# Patient Record
Sex: FEMALE | Race: WHITE | Employment: UNEMPLOYED | URBAN - METROPOLITAN AREA
[De-identification: names, ages, dates, MRNs, and addresses within clinical notes are randomized per-mention and may not be internally consistent; named-entity substitution may affect disease eponyms.]

---

## 2017-01-05 ENCOUNTER — OFFICE VISIT (OUTPATIENT)
Dept: FAMILY MEDICINE CLINIC | Age: 52
End: 2017-01-05

## 2017-01-05 VITALS
RESPIRATION RATE: 12 BRPM | DIASTOLIC BLOOD PRESSURE: 90 MMHG | HEIGHT: 64 IN | HEART RATE: 52 BPM | SYSTOLIC BLOOD PRESSURE: 110 MMHG | BODY MASS INDEX: 32.32 KG/M2 | TEMPERATURE: 98.3 F | WEIGHT: 189.31 LBS

## 2017-01-05 DIAGNOSIS — B95.8 STAPHYLOCOCCUS INFECTION OF NOSE: Primary | ICD-10-CM

## 2017-01-05 DIAGNOSIS — J34.89 STAPHYLOCOCCUS INFECTION OF NOSE: Primary | ICD-10-CM

## 2017-01-05 DIAGNOSIS — R59.1 LYMPHADENOPATHY: ICD-10-CM

## 2017-01-05 PROCEDURE — 96372 THER/PROPH/DIAG INJ SC/IM: CPT | Performed by: FAMILY MEDICINE

## 2017-01-05 PROCEDURE — 99213 OFFICE O/P EST LOW 20 MIN: CPT | Performed by: FAMILY MEDICINE

## 2017-01-05 RX ORDER — CEFTRIAXONE 1 G/1
1 INJECTION, POWDER, FOR SOLUTION INTRAMUSCULAR; INTRAVENOUS ONCE
Status: COMPLETED | OUTPATIENT
Start: 2017-01-05 | End: 2017-01-05

## 2017-01-05 RX ORDER — AMOXICILLIN AND CLAVULANATE POTASSIUM 875; 125 MG/1; MG/1
1 TABLET, FILM COATED ORAL 2 TIMES DAILY
Qty: 20 TABLET | Refills: 0 | Status: SHIPPED | OUTPATIENT
Start: 2017-01-05 | End: 2017-01-15

## 2017-01-05 RX ORDER — TRIAMCINOLONE ACETONIDE 40 MG/ML
80 INJECTION, SUSPENSION INTRA-ARTICULAR; INTRAMUSCULAR ONCE
Status: SHIPPED | OUTPATIENT
Start: 2017-01-05

## 2017-01-05 RX ADMIN — CEFTRIAXONE 1 G: 1 INJECTION, POWDER, FOR SOLUTION INTRAMUSCULAR; INTRAVENOUS at 14:40

## 2017-01-05 ASSESSMENT — ENCOUNTER SYMPTOMS
VOMITING: 0
DIARRHEA: 0
EYES NEGATIVE: 1
SORE THROAT: 1
SWOLLEN GLANDS: 1
NAUSEA: 0
CONSTIPATION: 0
SHORTNESS OF BREATH: 0
ABDOMINAL PAIN: 0
RHINORRHEA: 1
COUGH: 1

## 2017-01-09 ENCOUNTER — TELEPHONE (OUTPATIENT)
Dept: FAMILY MEDICINE CLINIC | Age: 52
End: 2017-01-09

## 2017-01-11 DIAGNOSIS — Z13.9 SCREENING: Primary | ICD-10-CM

## 2017-11-13 ENCOUNTER — OFFICE VISIT (OUTPATIENT)
Dept: FAMILY MEDICINE CLINIC | Age: 52
End: 2017-11-13

## 2017-11-13 VITALS
HEART RATE: 79 BPM | RESPIRATION RATE: 16 BRPM | SYSTOLIC BLOOD PRESSURE: 122 MMHG | HEIGHT: 64 IN | DIASTOLIC BLOOD PRESSURE: 86 MMHG | TEMPERATURE: 98.3 F | OXYGEN SATURATION: 99 %

## 2017-11-13 DIAGNOSIS — Z12.31 ENCOUNTER FOR SCREENING MAMMOGRAM FOR BREAST CANCER: ICD-10-CM

## 2017-11-13 DIAGNOSIS — M62.830 LUMBAR PARASPINAL MUSCLE SPASM: Primary | ICD-10-CM

## 2017-11-13 DIAGNOSIS — R30.0 DYSURIA: ICD-10-CM

## 2017-11-13 LAB
BILIRUBIN, POC: NORMAL
BLOOD URINE, POC: NORMAL
CLARITY, POC: NORMAL
COLOR, POC: NORMAL
GLUCOSE URINE, POC: NORMAL
KETONES, POC: NORMAL
LEUKOCYTE EST, POC: NORMAL
NITRITE, POC: NORMAL
PH, POC: 6
PROTEIN, POC: NORMAL
SPECIFIC GRAVITY, POC: 1.03
UROBILINOGEN, POC: NORMAL

## 2017-11-13 PROCEDURE — 1036F TOBACCO NON-USER: CPT | Performed by: FAMILY MEDICINE

## 2017-11-13 PROCEDURE — 99213 OFFICE O/P EST LOW 20 MIN: CPT | Performed by: FAMILY MEDICINE

## 2017-11-13 PROCEDURE — 81002 URINALYSIS NONAUTO W/O SCOPE: CPT | Performed by: FAMILY MEDICINE

## 2017-11-13 PROCEDURE — G8421 BMI NOT CALCULATED: HCPCS | Performed by: FAMILY MEDICINE

## 2017-11-13 PROCEDURE — G8484 FLU IMMUNIZE NO ADMIN: HCPCS | Performed by: FAMILY MEDICINE

## 2017-11-13 PROCEDURE — G8427 DOCREV CUR MEDS BY ELIG CLIN: HCPCS | Performed by: FAMILY MEDICINE

## 2017-11-13 PROCEDURE — 3017F COLORECTAL CA SCREEN DOC REV: CPT | Performed by: FAMILY MEDICINE

## 2017-11-13 RX ORDER — OXYCODONE HYDROCHLORIDE AND ACETAMINOPHEN 5; 325 MG/1; MG/1
1 TABLET ORAL EVERY 6 HOURS PRN
Qty: 20 TABLET | Refills: 0 | Status: SHIPPED | OUTPATIENT
Start: 2017-11-13 | End: 2017-11-20

## 2017-11-13 RX ORDER — CYCLOBENZAPRINE HCL 10 MG
10 TABLET ORAL 3 TIMES DAILY PRN
Qty: 60 TABLET | Refills: 0 | Status: SHIPPED | OUTPATIENT
Start: 2017-11-13 | End: 2017-11-23

## 2017-11-13 RX ORDER — OCTISALATE, AVOBENZONE, HOMOSALATE, AND OCTOCRYLENE 29.4; 29.4; 49; 25.48 MG/ML; MG/ML; MG/ML; MG/ML
4 LOTION TOPICAL DAILY
COMMUNITY
Start: 2017-07-12

## 2017-11-13 NOTE — PROGRESS NOTES
Subjective:      Patient ID: Diane León is a 50 y.o. female. Chief Complaint   Patient presents with    Back Pain     x 1 day. injury occurred when she bent over. Pain started in lower back and radiates upward into upper back and shoulders. Pain is sometimes very sharp. Has difficulty moving legs. Describes pain as \"electrical shock\". Has tried Tylenol and Advil with no relief. HPI    She is having some back pain for last day she bent over to pick something up and had severe pain in her lower back up on her shoulder. She describes like electric shock she denies any other trauma in the past.  Use Tylenol Advil no relief no bowel or bladder problems at this time is being going to bathroom more              Allergies   Allergen Reactions    Prochlorperazine Maleate      Outpatient Encounter Prescriptions as of 11/13/2017   Medication Sig Dispense Refill    linaclotide (LINZESS) 145 MCG capsule Take 145 mcg by mouth every morning (before breakfast)      Probiotic Product (ALIGN) 4 MG CAPS Take 4 mg by mouth daily      cyclobenzaprine (FLEXERIL) 10 MG tablet Take 1 tablet by mouth 3 times daily as needed for Muscle spasms 60 tablet 0    oxyCODONE-acetaminophen (PERCOCET) 5-325 MG per tablet Take 1 tablet by mouth every 6 hours as needed for Pain . 20 tablet 0    esomeprazole (NEXIUM) 40 MG capsule Take 40 mg by mouth every morning (before breakfast).  Fexofenadine-Pseudoephedrine (ALLEGRA-D 12 HOUR PO) Take  by mouth 2 times daily as needed. Facility-Administered Encounter Medications as of 11/13/2017   Medication Dose Route Frequency Provider Last Rate Last Dose    triamcinolone acetonide (KENALOG-40) injection 80 mg  80 mg Intramuscular Once Harford Katharine, DO         Social History     Social History    Marital status:      Spouse name: N/A    Number of children: N/A    Years of education: N/A     Occupational History    Not on file.      Social History Main Topics    Smoking status: Never Smoker    Smokeless tobacco: Never Used    Alcohol use Not on file    Drug use: Unknown    Sexual activity: Not on file     Other Topics Concern    Not on file     Social History Narrative    No narrative on file     Family History   Problem Relation Age of Onset    Diabetes Mother     Heart Disease Mother     High Blood Pressure Father     Cancer Father      prostate     Past Medical History:   Diagnosis Date    Colon polyps     Esophageal stricture     GERD (gastroesophageal reflux disease)     Irritable bowel syndrome     Kidney stones      Past Surgical History:   Procedure Laterality Date    APPENDECTOMY      CHOLECYSTECTOMY      HYSTERECTOMY      total         REVIEW OF SYSTEMS:   REVIEW OF SYSTEMS:   Patient seen today for exam.  Denies any problems with hearing, headaches or vision. Denies any shortness of breath, chest pain, nausea or vomiting. No black stool, no blood in the stool. No heartburn. Denies any problems with constipation or diarrhea either. No dysuria type symptoms. Objective:     /86 (Site: Left Arm, Position: Sitting, Cuff Size: Medium Adult)   Pulse 79   Temp 98.3 °F (36.8 °C) (Temporal)   Resp 16   Ht 5' 4\" (1.626 m)   SpO2 99%     Physical Exam      O:  Alert and active female in no acute distress  HEENT:  TMs clear. Pharynx neg. Nares clear, no drainage noted  Neck supple/ no adenopathy   HEART:  RRR without murmur/ no carotid bruits  LUNGS:  Clear to auscultation bilaterally, no wheeze or rhonchi noted  THYROID: neg masses or nodularity  ABDOMEN:  Soft x4. Bowel sounds positive. No masses or organomegaly,  Negative tenderness, guarding or rebound. EXTR:  Without edema./ good pulses bilat    Neurologic exam unremarkable. DTRs in upper and lower extremities within normal limits. Full strength noted    Skin- no lesions noted     Lumbar-positive spasm noted left greater than right  Assessment:      1.  Lumbar paraspinal muscle spasm     2. Encounter for screening mammogram for breast cancer  CHYNA DIGITAL SCREEN W OR WO CAD BILATERAL   3. Dysuria  POCT Urinalysis no Micro             Plan:        Orders Placed This Encounter   Medications    cyclobenzaprine (FLEXERIL) 10 MG tablet     Sig: Take 1 tablet by mouth 3 times daily as needed for Muscle spasms     Dispense:  60 tablet     Refill:  0    oxyCODONE-acetaminophen (PERCOCET) 5-325 MG per tablet     Sig: Take 1 tablet by mouth every 6 hours as needed for Pain .      Dispense:  20 tablet     Refill:  0     Orders Placed This Encounter   Procedures    CHYNA DIGITAL SCREEN W OR WO CAD BILATERAL     Standing Status:   Future     Standing Expiration Date:   1/13/2019     Order Specific Question:   Reason for exam:     Answer:   screening    POCT Urinalysis no Micro           Health Maintenance Due   Topic Date Due    DTaP/Tdap/Td vaccine (1 - Tdap) 03/27/1986    Colon cancer screen colonoscopy  03/27/2017    Breast cancer screen  11/18/2017             Controlled Substances Monitoring:              If anything worsens or changes please call us at once , follow-up in the office as planned,

## 2018-01-17 DIAGNOSIS — Z12.31 ENCOUNTER FOR SCREENING MAMMOGRAM FOR BREAST CANCER: ICD-10-CM

## 2019-04-18 ENCOUNTER — TELEPHONE (OUTPATIENT)
Dept: FAMILY MEDICINE CLINIC | Age: 54
End: 2019-04-18

## 2020-01-16 LAB — SURGICAL PATHOLOGY REPORT: NORMAL

## 2023-08-17 ENCOUNTER — LAB (OUTPATIENT)
Dept: LAB | Facility: LAB | Age: 58
End: 2023-08-17
Payer: MEDICARE

## 2023-08-17 ENCOUNTER — OFFICE VISIT (OUTPATIENT)
Dept: PRIMARY CARE | Facility: CLINIC | Age: 58
End: 2023-08-17
Payer: MEDICARE

## 2023-08-17 VITALS
BODY MASS INDEX: 33.36 KG/M2 | HEART RATE: 60 BPM | TEMPERATURE: 98.6 F | SYSTOLIC BLOOD PRESSURE: 116 MMHG | WEIGHT: 195.4 LBS | OXYGEN SATURATION: 97 % | RESPIRATION RATE: 12 BRPM | DIASTOLIC BLOOD PRESSURE: 74 MMHG | HEIGHT: 64 IN

## 2023-08-17 DIAGNOSIS — M79.672 PAIN IN BOTH FEET: ICD-10-CM

## 2023-08-17 DIAGNOSIS — M72.2 BILATERAL PLANTAR FASCIITIS: Primary | ICD-10-CM

## 2023-08-17 DIAGNOSIS — Z01.818 PRE-OP EXAM: ICD-10-CM

## 2023-08-17 DIAGNOSIS — M45.9 ANKYLOSING SPONDYLITIS, UNSPECIFIED SITE OF SPINE (MULTI): ICD-10-CM

## 2023-08-17 DIAGNOSIS — R30.0 DYSURIA: ICD-10-CM

## 2023-08-17 DIAGNOSIS — M79.671 PAIN IN BOTH FEET: ICD-10-CM

## 2023-08-17 DIAGNOSIS — R41.840 ATTENTION DEFICIT: ICD-10-CM

## 2023-08-17 DIAGNOSIS — E66.09 CLASS 1 OBESITY DUE TO EXCESS CALORIES WITH SERIOUS COMORBIDITY AND BODY MASS INDEX (BMI) OF 33.0 TO 33.9 IN ADULT: ICD-10-CM

## 2023-08-17 DIAGNOSIS — E78.5 DYSLIPIDEMIA: ICD-10-CM

## 2023-08-17 DIAGNOSIS — Z01.818 PREOP TESTING: ICD-10-CM

## 2023-08-17 DIAGNOSIS — M72.2 BILATERAL PLANTAR FASCIITIS: ICD-10-CM

## 2023-08-17 PROBLEM — M65.331 TRIGGER MIDDLE FINGER OF RIGHT HAND: Status: ACTIVE | Noted: 2023-08-17

## 2023-08-17 PROBLEM — M79.642 PAIN IN BOTH HANDS: Status: ACTIVE | Noted: 2023-08-17

## 2023-08-17 PROBLEM — M47.812 CERVICAL SPONDYLOSIS: Status: ACTIVE | Noted: 2023-08-17

## 2023-08-17 PROBLEM — R53.83 FATIGUE: Status: ACTIVE | Noted: 2023-08-17

## 2023-08-17 PROBLEM — B37.31 YEAST VAGINITIS: Status: ACTIVE | Noted: 2023-08-17

## 2023-08-17 PROBLEM — I10 WHITE COAT SYNDROME WITH HYPERTENSION: Status: ACTIVE | Noted: 2023-08-17

## 2023-08-17 PROBLEM — R22.9 SUBCUTANEOUS NODULES: Status: ACTIVE | Noted: 2023-08-17

## 2023-08-17 PROBLEM — R31.9 HEMATURIA: Status: ACTIVE | Noted: 2023-08-17

## 2023-08-17 PROBLEM — J02.9 SORE THROAT: Status: ACTIVE | Noted: 2023-08-17

## 2023-08-17 PROBLEM — K21.9 ESOPHAGEAL REFLUX: Status: ACTIVE | Noted: 2023-08-17

## 2023-08-17 PROBLEM — I51.7 LEFT ATRIAL ENLARGEMENT: Status: ACTIVE | Noted: 2023-08-17

## 2023-08-17 PROBLEM — B95.8 STAPH INFECTION: Status: ACTIVE | Noted: 2023-08-17

## 2023-08-17 PROBLEM — K58.9 IBS (IRRITABLE BOWEL SYNDROME): Status: ACTIVE | Noted: 2023-08-17

## 2023-08-17 PROBLEM — G89.18 POST-OP PAIN: Status: ACTIVE | Noted: 2023-08-17

## 2023-08-17 PROBLEM — M19.90 ARTHRITIS: Status: ACTIVE | Noted: 2023-08-17

## 2023-08-17 PROBLEM — G56.01 CARPAL TUNNEL SYNDROME OF RIGHT WRIST: Status: ACTIVE | Noted: 2023-08-17

## 2023-08-17 PROBLEM — N95.8 GENITOURINARY SYNDROME OF MENOPAUSE: Status: ACTIVE | Noted: 2023-08-17

## 2023-08-17 PROBLEM — B00.9 HERPES SIMPLEX: Status: ACTIVE | Noted: 2023-08-17

## 2023-08-17 PROBLEM — M75.01 ADHESIVE CAPSULITIS OF RIGHT SHOULDER: Status: ACTIVE | Noted: 2023-08-17

## 2023-08-17 PROBLEM — M79.641 PAIN IN BOTH HANDS: Status: ACTIVE | Noted: 2023-08-17

## 2023-08-17 PROBLEM — R07.9 CHEST PAIN: Status: ACTIVE | Noted: 2023-08-17

## 2023-08-17 PROBLEM — M79.604 BILATERAL LEG AND FOOT PAIN: Status: ACTIVE | Noted: 2023-08-17

## 2023-08-17 PROBLEM — N95.2 ATROPHIC VAGINITIS: Status: ACTIVE | Noted: 2023-08-17

## 2023-08-17 PROBLEM — M79.605 BILATERAL LEG AND FOOT PAIN: Status: ACTIVE | Noted: 2023-08-17

## 2023-08-17 PROBLEM — K59.00 CONSTIPATION: Status: ACTIVE | Noted: 2023-08-17

## 2023-08-17 PROBLEM — B37.9 CANDIDIASIS: Status: ACTIVE | Noted: 2023-08-17

## 2023-08-17 LAB
ACTIVATED PARTIAL THROMBOPLASTIN TIME IN PPP BY COAGULATION ASSAY: 33 SEC (ref 27–38)
ALANINE AMINOTRANSFERASE (SGPT) (U/L) IN SER/PLAS: 32 U/L (ref 7–45)
ALBUMIN (G/DL) IN SER/PLAS: 4.2 G/DL (ref 3.4–5)
ALKALINE PHOSPHATASE (U/L) IN SER/PLAS: 98 U/L (ref 33–110)
ANION GAP IN SER/PLAS: 13 MMOL/L (ref 10–20)
APPEARANCE, URINE: ABNORMAL
ASPARTATE AMINOTRANSFERASE (SGOT) (U/L) IN SER/PLAS: 26 U/L (ref 9–39)
BACTERIA, URINE: ABNORMAL /HPF
BASOPHILS (10*3/UL) IN BLOOD BY AUTOMATED COUNT: 0.06 X10E9/L (ref 0–0.1)
BASOPHILS/100 LEUKOCYTES IN BLOOD BY AUTOMATED COUNT: 0.8 % (ref 0–2)
BILIRUBIN TOTAL (MG/DL) IN SER/PLAS: 0.4 MG/DL (ref 0–1.2)
BILIRUBIN, URINE: NEGATIVE
BLOOD, URINE: ABNORMAL
CALCIUM (MG/DL) IN SER/PLAS: 9.4 MG/DL (ref 8.6–10.3)
CARBON DIOXIDE, TOTAL (MMOL/L) IN SER/PLAS: 28 MMOL/L (ref 21–32)
CHLORIDE (MMOL/L) IN SER/PLAS: 103 MMOL/L (ref 98–107)
CHOLESTEROL (MG/DL) IN SER/PLAS: 190 MG/DL (ref 0–199)
CHOLESTEROL IN HDL (MG/DL) IN SER/PLAS: 57 MG/DL
CHOLESTEROL/HDL RATIO: 3.3
COLOR, URINE: YELLOW
CREATININE (MG/DL) IN SER/PLAS: 0.77 MG/DL (ref 0.5–1.05)
EOSINOPHILS (10*3/UL) IN BLOOD BY AUTOMATED COUNT: 0.14 X10E9/L (ref 0–0.7)
EOSINOPHILS/100 LEUKOCYTES IN BLOOD BY AUTOMATED COUNT: 1.8 % (ref 0–6)
ERYTHROCYTE DISTRIBUTION WIDTH (RATIO) BY AUTOMATED COUNT: 14 % (ref 11.5–14.5)
ERYTHROCYTE MEAN CORPUSCULAR HEMOGLOBIN CONCENTRATION (G/DL) BY AUTOMATED: 30.9 G/DL (ref 32–36)
ERYTHROCYTE MEAN CORPUSCULAR VOLUME (FL) BY AUTOMATED COUNT: 84 FL (ref 80–100)
ERYTHROCYTES (10*6/UL) IN BLOOD BY AUTOMATED COUNT: 5.17 X10E12/L (ref 4–5.2)
GFR FEMALE: 90 ML/MIN/1.73M2
GLUCOSE (MG/DL) IN SER/PLAS: 92 MG/DL (ref 74–99)
GLUCOSE, URINE: NEGATIVE MG/DL
HEMATOCRIT (%) IN BLOOD BY AUTOMATED COUNT: 43.3 % (ref 36–46)
HEMOGLOBIN (G/DL) IN BLOOD: 13.4 G/DL (ref 12–16)
IMMATURE GRANULOCYTES/100 LEUKOCYTES IN BLOOD BY AUTOMATED COUNT: 0.4 % (ref 0–0.9)
INR IN PPP BY COAGULATION ASSAY: 1 (ref 0.9–1.1)
KETONES, URINE: NEGATIVE MG/DL
LDL: 109 MG/DL (ref 0–99)
LEUKOCYTE ESTERASE, URINE: NEGATIVE
LEUKOCYTES (10*3/UL) IN BLOOD BY AUTOMATED COUNT: 7.9 X10E9/L (ref 4.4–11.3)
LYMPHOCYTES (10*3/UL) IN BLOOD BY AUTOMATED COUNT: 2.59 X10E9/L (ref 1.2–4.8)
LYMPHOCYTES/100 LEUKOCYTES IN BLOOD BY AUTOMATED COUNT: 32.9 % (ref 13–44)
MONOCYTES (10*3/UL) IN BLOOD BY AUTOMATED COUNT: 0.42 X10E9/L (ref 0.1–1)
MONOCYTES/100 LEUKOCYTES IN BLOOD BY AUTOMATED COUNT: 5.3 % (ref 2–10)
MUCUS, URINE: ABNORMAL /LPF
NEUTROPHILS (10*3/UL) IN BLOOD BY AUTOMATED COUNT: 4.63 X10E9/L (ref 1.2–7.7)
NEUTROPHILS/100 LEUKOCYTES IN BLOOD BY AUTOMATED COUNT: 58.8 % (ref 40–80)
NITRITE, URINE: NEGATIVE
PH, URINE: 5 (ref 5–8)
PLATELETS (10*3/UL) IN BLOOD AUTOMATED COUNT: 302 X10E9/L (ref 150–450)
POTASSIUM (MMOL/L) IN SER/PLAS: 3.8 MMOL/L (ref 3.5–5.3)
PROTEIN TOTAL: 8 G/DL (ref 6.4–8.2)
PROTEIN, URINE: NEGATIVE MG/DL
PROTHROMBIN TIME (PT) IN PPP BY COAGULATION ASSAY: 11.8 SEC (ref 9.8–12.8)
RBC, URINE: 1 /HPF (ref 0–5)
SODIUM (MMOL/L) IN SER/PLAS: 140 MMOL/L (ref 136–145)
SPECIFIC GRAVITY, URINE: 1.02 (ref 1–1.03)
SQUAMOUS EPITHELIAL CELLS, URINE: 6 /HPF
TRIGLYCERIDE (MG/DL) IN SER/PLAS: 121 MG/DL (ref 0–149)
UREA NITROGEN (MG/DL) IN SER/PLAS: 12 MG/DL (ref 6–23)
UROBILINOGEN, URINE: <2 MG/DL (ref 0–1.9)
VLDL: 24 MG/DL (ref 0–40)
WBC, URINE: 5 /HPF (ref 0–5)

## 2023-08-17 PROCEDURE — 80053 COMPREHEN METABOLIC PANEL: CPT

## 2023-08-17 PROCEDURE — 3074F SYST BP LT 130 MM HG: CPT | Performed by: FAMILY MEDICINE

## 2023-08-17 PROCEDURE — 1036F TOBACCO NON-USER: CPT | Performed by: FAMILY MEDICINE

## 2023-08-17 PROCEDURE — 81001 URINALYSIS AUTO W/SCOPE: CPT

## 2023-08-17 PROCEDURE — 85025 COMPLETE CBC W/AUTO DIFF WBC: CPT

## 2023-08-17 PROCEDURE — 3078F DIAST BP <80 MM HG: CPT | Performed by: FAMILY MEDICINE

## 2023-08-17 PROCEDURE — 99214 OFFICE O/P EST MOD 30 MIN: CPT | Performed by: FAMILY MEDICINE

## 2023-08-17 PROCEDURE — 80061 LIPID PANEL: CPT

## 2023-08-17 PROCEDURE — 85730 THROMBOPLASTIN TIME PARTIAL: CPT

## 2023-08-17 PROCEDURE — 85610 PROTHROMBIN TIME: CPT

## 2023-08-17 PROCEDURE — 36415 COLL VENOUS BLD VENIPUNCTURE: CPT

## 2023-08-17 PROCEDURE — 87086 URINE CULTURE/COLONY COUNT: CPT

## 2023-08-17 PROCEDURE — 3008F BODY MASS INDEX DOCD: CPT | Performed by: FAMILY MEDICINE

## 2023-08-17 RX ORDER — ESOMEPRAZOLE MAGNESIUM 40 MG/1
40 CAPSULE, DELAYED RELEASE ORAL
COMMUNITY

## 2023-08-17 RX ORDER — NYSTATIN 100000 [USP'U]/G
POWDER TOPICAL
COMMUNITY
Start: 2021-04-12

## 2023-08-17 RX ORDER — ESTRADIOL 10 UG/1
INSERT VAGINAL
COMMUNITY
Start: 2021-04-12

## 2023-08-17 RX ORDER — CYCLOBENZAPRINE HCL 10 MG
10 TABLET ORAL 2 TIMES DAILY PRN
COMMUNITY
Start: 2022-08-01

## 2023-08-17 RX ORDER — CELECOXIB 200 MG/1
1 CAPSULE ORAL 2 TIMES DAILY
COMMUNITY
End: 2024-04-25 | Stop reason: WASHOUT

## 2023-08-17 RX ORDER — ASPIRIN 325 MG
TABLET, DELAYED RELEASE (ENTERIC COATED) ORAL
COMMUNITY
End: 2023-11-02

## 2023-08-17 ASSESSMENT — PATIENT HEALTH QUESTIONNAIRE - PHQ9
SUM OF ALL RESPONSES TO PHQ9 QUESTIONS 1 AND 2: 0
1. LITTLE INTEREST OR PLEASURE IN DOING THINGS: NOT AT ALL
2. FEELING DOWN, DEPRESSED OR HOPELESS: NOT AT ALL

## 2023-08-17 NOTE — PROGRESS NOTES
Subjective   Patient ID: Nereyda Arauz is a 58 y.o. female who presents for Pre-op Clearance and Plantar Fasciitis.    HPI    Patient presents today for pre-op exam  Surgeon- Dr. Giraldo  Date- 8/25/23  Facility- [facility]  Procedure- Plantar Fasciitis Botox Injections  Patient states they did blood work, and EKG.  She will have both feet done.    Patient denies having history of any allergic reactions to anesthesia.  Patient is not currently under the care of Cardiology  Only had issues with anesthesia during colonoscopy and woke up during procedure.      Taking current medications which were reviewed.  Problem list discussed.    Overall doing well.  Eating okay.  Staying active.    Has no other new problem /question.    Review of systems  ; Patient seen today for exam denies any problems with headaches or vision, denies any shortness of breath chest pain nausea or vomiting, no black stool no blood in the stool no heartburn type symptoms denies any problems with constipation or diarrhea, and no dysuria-type symptoms    The patient's allergies medications were reviewed with them today    The patient's social family and surgical history or also reviewed here today, along with her past medical history.     Objective     Alert and active in  no acute distress  HEENT TMs clear oropharynx negative nares clear no drainage noted neck supple  With no adenopathy   Heart regular rate and rhythm without murmur and no carotid bruits  Lungs- clear to auscultation bilaterally, no wheeze or rhonchi noted  Thyroid -negative masses or nodularity  Abdomen- soft times four quadrants , bowel sounds positive no masses or organomegaly, negative tenderness guarding or rebound  Neurological exam unremarkable- DTRs in upper and lower extremities within normal limits.   skin -no lesions noted    Bilateral feet examined with plantar scars noted from previous surgeries tenderness over the tight cords noted      /74   Pulse 60    "Temp 37 °C (98.6 °F)   Resp 12   Ht 1.626 m (5' 4\")   Wt 88.6 kg (195 lb 6.4 oz)   LMP  (LMP Unknown)   SpO2 97%   BMI 33.54 kg/m²     Allergies   Allergen Reactions    Prochlorperazine Maleate Unknown    Sucralfate Other       Assessment/Plan   Problem List Items Addressed This Visit       Ankylosing spondylitis (CMS/HCC)    Attention deficit    Dyslipidemia    Relevant Orders    Comprehensive metabolic panel (Completed)    Lipid panel (Completed)    CBC and Auto Differential (Completed)     Other Visit Diagnoses       Bilateral plantar fasciitis    -  Primary    Relevant Orders    Comprehensive metabolic panel (Completed)    Pre-op exam        Relevant Orders    Comprehensive metabolic panel (Completed)    BMI 33.0-33.9,adult        Class 1 obesity due to excess calories with serious comorbidity and body mass index (BMI) of 33.0 to 33.9 in adult        Preop testing        Dysuria        Relevant Orders    Urine Culture    Urinalysis with Reflex Microscopic (Completed)    Pain in both feet        Relevant Orders    Coagulation Screen (Completed)          Recommend shoes with good arch support.  Recommend [employer] store.    We will sen information to Dr. Giraldo' office.    Reviewed EKG, unremarkable.  And stress test from December she is cleared    Labs have been ordered, she/he will have these performed and we will contact her/him with results.  (CBC, CMP, Lipid, COAG, UA, Urine Culture)    Blood work so far all unremarkable    She is cleared for surgery normal risk for 58-year-old female    If anything worsens or changes please call us at once, follow up in the office as planned.    Scribe Attestation  By signing my name below, I, Lisa Lopez MA, Scribe   attest that this documentation has been prepared under the direction and in the presence of Tejas Steward DO.     "

## 2023-08-18 ENCOUNTER — TELEPHONE (OUTPATIENT)
Dept: PRIMARY CARE | Facility: CLINIC | Age: 58
End: 2023-08-18
Payer: MEDICARE

## 2023-08-18 LAB — URINE CULTURE: NORMAL

## 2023-08-18 NOTE — TELEPHONE ENCOUNTER
----- Message from Tejas Steward DO sent at 8/18/2023  8:01 AM EDT -----  Cholesterol is better all other labs are okay for surgery we will fax this over to her doctor please let her know thanks

## 2023-08-25 ENCOUNTER — HOSPITAL ENCOUNTER (OUTPATIENT)
Dept: DATA CONVERSION | Facility: HOSPITAL | Age: 58
End: 2023-08-25
Attending: PODIATRIST | Admitting: PODIATRIST
Payer: MEDICARE

## 2023-08-25 DIAGNOSIS — M72.2 PLANTAR FASCIAL FIBROMATOSIS: ICD-10-CM

## 2023-09-09 LAB
ATRIAL RATE: 67 BPM
P AXIS: 72 DEGREES
P OFFSET: 171 MS
P ONSET: 140 MS
PR INTERVAL: 152 MS
Q ONSET: 216 MS
QRS COUNT: 11 BEATS
QRS DURATION: 78 MS
QT INTERVAL: 418 MS
QTC CALCULATION(BAZETT): 441 MS
QTC FREDERICIA: 434 MS
R AXIS: 0 DEGREES
T AXIS: 4 DEGREES
T OFFSET: 425 MS
VENTRICULAR RATE: 67 BPM

## 2023-09-29 VITALS — WEIGHT: 194 LBS | HEIGHT: 64 IN | BODY MASS INDEX: 33.12 KG/M2

## 2023-10-01 NOTE — OP NOTE
PROCEDURE DETAILS    Preoperative Diagnosis:  Plantar fascial fibromatosis, M72.2    Postoperative Diagnosis:  Plantar fascial fibromatosis, M72.2    Surgeon: Michele Giraldo  Resident/Fellow/Other Assistant: Yamil Ramirez DPM PGY3    Procedure:  1. BILATERAL FEET BOTOX INJECTION / BILATERAL FEET CORTICOSTEROID INJECTION    Anesthesia: Krish Spain  Estimated Blood Loss: 0  Findings: Consistent with clinical findings  Specimens(s) Collected: no,     Complications: None  Tourniquet Times: None  Patient Returned To/Condition: PACU with stable vitals        Operative Report:   The patient was evaluated in the preoperative setting and the areas of maximum tenderness were marked in a grid pattern over the bottome of both feet. The areas  of maximum tenderness were also identified on the posterior heels as well.     The patient was brought into the OR and left supine on her hospital bed.  Following initiation of IV anesthesia the b/l feet were prepped with alcohol and using a 22G needle 0.5 mls of the previously prepared 10u botox / 1mL sterile saline was injected  into each of the previously market areas on the b/l feet to total 10mL and 100u botox total.    The location of maximum tenderness from the Achilles bursitis was then injected b/l with 3ccs of a 1:1 mixture of 4mg/ml Dexamethasone and 0.5% Marcaine plain.    The areas were then covered with adhesive bandages and the patient was transported to PACU with stable vitals. She tolerated the procedure well.                        Attestation:   Note Completion:  Attending Attestation I was present for the entire procedure    I am a: Resident/Fellow         Electronic Signatures:  Michele Giraldo (DONITA)  (Signed 28-Aug-2023 17:40)   Authored: Note Completion   Co-Signer: Post-Operative Note, Chart Review, Note Completion  James Lobo (DONITA (Resident))  (Signed 27-Aug-2023 14:50)   Authored: Post-Operative Note, Chart Review, Note  Completion      Last Updated: 28-Aug-2023 17:40 by Michele Giraldo (DONITA)

## 2023-10-16 PROBLEM — R13.10 DYSPHAGIA: Status: ACTIVE | Noted: 2017-03-24

## 2023-10-16 PROBLEM — K27.9 PUD (PEPTIC ULCER DISEASE): Status: ACTIVE | Noted: 2017-03-24

## 2023-10-16 RX ORDER — IBUPROFEN AND FAMOTIDINE 26.6; 8 MG/1; MG/1
TABLET ORAL
COMMUNITY
End: 2023-11-02 | Stop reason: ALTCHOICE

## 2023-10-16 RX ORDER — CLONAZEPAM 0.5 MG/1
0.5 TABLET ORAL
COMMUNITY
End: 2023-11-02 | Stop reason: ALTCHOICE

## 2023-10-16 RX ORDER — POLYETHYLENE GLYCOL 3350 17 G/17G
POWDER, FOR SOLUTION ORAL
COMMUNITY
Start: 2009-01-20 | End: 2023-11-02 | Stop reason: ALTCHOICE

## 2023-10-16 RX ORDER — FLUTICASONE PROPIONATE 50 MCG
SPRAY, SUSPENSION (ML) NASAL
COMMUNITY
Start: 2016-02-02 | End: 2023-11-02 | Stop reason: ALTCHOICE

## 2023-10-16 RX ORDER — TRIAMCINOLONE ACETONIDE 40 MG/ML
80 INJECTION, SUSPENSION INTRA-ARTICULAR; INTRAMUSCULAR
COMMUNITY
Start: 2017-01-05 | End: 2023-11-02 | Stop reason: ALTCHOICE

## 2023-10-16 RX ORDER — AMOXICILLIN 500 MG/1
TABLET, FILM COATED ORAL
COMMUNITY
Start: 2023-04-04 | End: 2023-10-25 | Stop reason: ALTCHOICE

## 2023-10-16 RX ORDER — CLINDAMYCIN AND BENZOYL PEROXIDE 10; 50 MG/G; MG/G
GEL TOPICAL
COMMUNITY
Start: 2021-11-02 | End: 2023-11-02 | Stop reason: ALTCHOICE

## 2023-10-16 RX ORDER — SULFASALAZINE 500 MG/1
TABLET ORAL
COMMUNITY
Start: 2023-02-17 | End: 2023-11-02

## 2023-10-16 RX ORDER — ERGOCALCIFEROL 1.25 MG/1
CAPSULE ORAL
COMMUNITY
Start: 2022-08-03

## 2023-10-16 RX ORDER — VENLAFAXINE HYDROCHLORIDE 75 MG/1
75 CAPSULE, EXTENDED RELEASE ORAL
COMMUNITY
End: 2023-11-02

## 2023-10-16 RX ORDER — BISACODYL 5 MG
TABLET, DELAYED RELEASE (ENTERIC COATED) ORAL
COMMUNITY
Start: 2004-03-22 | End: 2023-11-02 | Stop reason: ALTCHOICE

## 2023-10-16 RX ORDER — TRETINOIN 0.25 MG/G
CREAM TOPICAL
COMMUNITY
Start: 2021-11-02 | End: 2023-11-02 | Stop reason: ALTCHOICE

## 2023-10-16 RX ORDER — CIPROFLOXACIN 500 MG/1
TABLET ORAL
COMMUNITY
Start: 2023-03-03 | End: 2023-11-02 | Stop reason: ALTCHOICE

## 2023-10-16 RX ORDER — ALUMINUM ZIRCONIUM OCTACHLOROHYDREX GLY 16 G/100G
4 GEL TOPICAL DAILY
COMMUNITY
Start: 2017-07-12

## 2023-10-18 ENCOUNTER — OFFICE VISIT (OUTPATIENT)
Dept: ORTHOPEDIC SURGERY | Facility: CLINIC | Age: 58
End: 2023-10-18
Payer: MEDICARE

## 2023-10-18 DIAGNOSIS — M72.0 DUPUYTREN'S CONTRACTURE OF RIGHT HAND: Primary | ICD-10-CM

## 2023-10-18 PROCEDURE — 99213 OFFICE O/P EST LOW 20 MIN: CPT | Performed by: ORTHOPAEDIC SURGERY

## 2023-10-18 PROCEDURE — 3008F BODY MASS INDEX DOCD: CPT | Performed by: ORTHOPAEDIC SURGERY

## 2023-10-18 PROCEDURE — 99203 OFFICE O/P NEW LOW 30 MIN: CPT | Performed by: ORTHOPAEDIC SURGERY

## 2023-10-18 PROCEDURE — 1036F TOBACCO NON-USER: CPT | Performed by: ORTHOPAEDIC SURGERY

## 2023-10-18 NOTE — LETTER
November 19, 2023     Tejas Steward DO  [address] [facility] [address]    Patient: Nereyda Arauz   YOB: 1965   Date of Visit: 10/18/2023       Dear Dr. Tejas Steward DO:    Thank you for referring Nereyda Arauz to me for evaluation. Below are my notes for this consultation.  If you have questions, please do not hesitate to call me. I look forward to following your patient along with you.       Sincerely,     Tejas Haq MD      CC: No Recipients  ______________________________________________________________________________________    CHIEF COMPLAINT         Dupuytren's contracture     ASSESSMENT + PLAN    Right ring ray Dupuytren's contracture  M-30  P-0 with knuckle pads at right index and ring, left small    I reviewed the nature of Dupuytren's disease and the intermittently progressive typical clinical course.  I discussed the multitude of treatment options including simple observation, formal open surgical cord excision, needle release, and Xiaflex enzyme injection along with the major risks, benefits, and durability of each option.    As the current position of the ring finger is not significantly impacting daily activity, we agreed on continued simple observation for now.  I stressed that any surgery on the knuckle pads would be expected to have fairly rapid recurrence, as indeed it already has.  I reviewed the high risk of injury to the central slip of the extensor mechanism with knuckle pad excision, and how that would permanently interfere with good motion of the fingers.  I generally do not recommend intervention with knuckle pads.    Follow-up with any additional concerns.        HISTORY OF PRESENT ILLNESS       Patient is a 58 y.o. right-hand dominant retired female, who presents today for evaluation of Dupuytren's disease of the right hand.  She had previous surgery in September 2021 from Dr. Olivia for knuckle pads but had very rapid recurrence.  She has  difficulty extending the right ring finger but reports this is not significantly interfering with overall hand use.  She is more concerned about the appearance of the knuckle pads.  No popping, clicking, or instability.  No difficulty with grasp at present.    She is not diabetic or hypothyroid.  She does not smoke.  She does take Flexeril.      REVIEW OF SYSTEMS       A 30-item multi-system Review Of Systems was obtained on today's intake form.  This was reviewed with the patient and is correct.  The pertinent positives and negatives are listed above.  The form has been scanned separately into the medical record.      PHYSICAL EXAM    Constitutional:    Appears stated age. Well-developed and well-nourished obese female in no acute distress.  Psychiatric:         Pleasant normal mood and affect. Behavior is appropriate for the situation.   Head:                   Normocephalic and atraumatic.  Eyes:                    Pupils are equal and round.  Cardiovascular:  2+ radial and ulnar pulses. Fingers well-perfused.  Respiratory:        Effort normal. No respiratory distress. Speaking in complete sentences.  Neurologic:       Alert and oriented to person, place, and time.  Skin:                Skin is intact, warm and dry.  Hematologic / Lymphatic:    No lymphedema or lymphangitis.    Extremities / Musculoskeletal:                      Skin of both hands and wrists is intact with no erythema, ecchymosis, or diffuse swelling.  Normal skin drag and coloration.  There is a central Dupuytren's cord in the right ring ray producing a 30 degree MP contracture but allowing full PIP extension.  There is thickening and loss of the normal ridge pattern on the dorsum of the right index and ring and left small PIP joints consistent with Dupuytren's knuckle pads.  Mild nodules in both palms as well.  Good reversal to full composite flexion.  Symmetric wrist and forearm motion.  Negative Moreland.  Negative midcarpal shift.  Sensation  intact to light touch in all distributions.  Capillary refill less than 2 seconds.      IMAGING / LABS / EMGs           None pertinent      Past Medical History:   Diagnosis Date   • COVID-19 2022    COVID-19   • COVID-19 2022    Positive self-administered antigen test for COVID-19   • Headache, unspecified     Generalized headaches   • Personal history of other diseases of the respiratory system 2022    History of sore throat   • Personal history of urinary (tract) infections     H/O bladder infections       Medication Documentation Review Audit       Reviewed by Tejas Haq MD (Physician) on 23 at 1229      Medication Order Taking? Sig Documenting Provider Last Dose Status   Bifidobacterium infantis (Align) 4 mg capsule [id number] No Take 1 capsule (4 mg) by mouth once daily. Jacky Swann MD 2023 Active   celecoxib (CeleBREX) 200 mg capsule [id number] No Take 1 capsule (200 mg) by mouth 2 times a day. Jacky Swann MD Past Week Active   cyclobenzaprine (Flexeril) 10 mg tablet [id number] No Take 1 tablet (10 mg) by mouth 2 times a day as needed. Historical Provider, MD Past Week Active   ergocalciferol (Vitamin D-2) 1.25 MG (20535 UT) capsule [id number] No  Historical Provider, MD Past Week Active   esomeprazole (NexIUM) 40 mg DR capsule [id number] No Take 1 capsule (40 mg) by mouth once daily in the morning. Take before meals. Jacky Swann MD 2023 Active   estradiol (Vagifem) 10 mcg tablet vaginal tablet [id number] No Insert into the vagina. Historical Provider, MD Past Week Active   linaCLOtide (Linzess) 145 mcg capsule [id number] No Take by mouth. Historical Provider, MD Past Week Active   nitrofurantoin, macrocrystal-monohydrate, (Macrobid) 100 mg capsule [id number] No Take 1 capsule (100 mg) by mouth 2 times a day for 7 days. Tejas Steward,  2023  23 2359   nystatin (Mycostatin) 100,000 unit/gram powder [id number] No Apply to affected skin  nightly as needed Historical Provider, MD Past Week Active   traMADol (Ultram) 50 mg tablet [id number] No Take 1 tablet (50 mg) by mouth. Historical Provider, MD Past Week Active                    Allergies   Allergen Reactions   • Prochlorperazine Maleate Nausea/vomiting   • Sucralfate Itching and Rash       Social History     Socioeconomic History   • Marital status:      Spouse name: Not on file   • Number of children: Not on file   • Years of education: Not on file   • Highest education level: Not on file   Occupational History   • Not on file   Tobacco Use   • Smoking status: Never   • Smokeless tobacco: Never   Vaping Use   • Vaping Use: Not on file   Substance and Sexual Activity   • Alcohol use: Never   • Drug use: Never   • Sexual activity: Defer   Other Topics Concern   • Not on file   Social History Narrative   • Not on file     Social Determinants of Health     Financial Resource Strain: Not on file   Food Insecurity: Not on file   Transportation Needs: Not on file   Physical Activity: Not on file   Stress: Not on file   Social Connections: Not on file   Intimate Partner Violence: Not on file   Housing Stability: Not on file       Past Surgical History:   Procedure Laterality Date   • OTHER SURGICAL HISTORY  04/12/2021    Hysterectomy   • OTHER SURGICAL HISTORY  04/12/2021    Gallbladder surgery   • OTHER SURGICAL HISTORY  04/12/2021    Hand surgery   • OTHER SURGICAL HISTORY  04/12/2021    Foot surgery   • OTHER SURGICAL HISTORY  04/12/2021    Appendectomy   • OTHER SURGICAL HISTORY  04/12/2021    Tubal ligation   • OTHER SURGICAL HISTORY  04/12/2021    Appendectomy   • OTHER SURGICAL HISTORY  04/12/2021    Hysterectomy   • OTHER SURGICAL HISTORY  04/12/2021    Cholecystectomy   • SHOULDER SURGERY  11/13/2013    Shoulder Surgery         Electronically Signed      VASHTI Haq MD      Orthopaedic Hand Surgery      [phone]

## 2023-10-25 RX ORDER — TRAMADOL HYDROCHLORIDE 50 MG/1
50 TABLET ORAL
COMMUNITY
Start: 2023-09-19 | End: 2023-12-27 | Stop reason: HOSPADM

## 2023-11-02 ENCOUNTER — OFFICE VISIT (OUTPATIENT)
Dept: CARDIOLOGY | Facility: CLINIC | Age: 58
End: 2023-11-02
Payer: MEDICARE

## 2023-11-02 VITALS
HEIGHT: 60 IN | OXYGEN SATURATION: 96 % | DIASTOLIC BLOOD PRESSURE: 78 MMHG | HEART RATE: 65 BPM | WEIGHT: 193 LBS | SYSTOLIC BLOOD PRESSURE: 124 MMHG | BODY MASS INDEX: 37.89 KG/M2

## 2023-11-02 DIAGNOSIS — I51.7 LEFT ATRIAL ENLARGEMENT: Primary | ICD-10-CM

## 2023-11-02 DIAGNOSIS — I10 WHITE COAT SYNDROME WITH HYPERTENSION: ICD-10-CM

## 2023-11-02 PROCEDURE — 3078F DIAST BP <80 MM HG: CPT | Performed by: INTERNAL MEDICINE

## 2023-11-02 PROCEDURE — 99214 OFFICE O/P EST MOD 30 MIN: CPT | Performed by: INTERNAL MEDICINE

## 2023-11-02 PROCEDURE — 1036F TOBACCO NON-USER: CPT | Performed by: INTERNAL MEDICINE

## 2023-11-02 PROCEDURE — 3074F SYST BP LT 130 MM HG: CPT | Performed by: INTERNAL MEDICINE

## 2023-11-02 PROCEDURE — 3008F BODY MASS INDEX DOCD: CPT | Performed by: INTERNAL MEDICINE

## 2023-11-02 ASSESSMENT — ENCOUNTER SYMPTOMS
OCCASIONAL FEELINGS OF UNSTEADINESS: 0
LOSS OF SENSATION IN FEET: 0
DEPRESSION: 0

## 2023-11-02 ASSESSMENT — PAIN SCALES - GENERAL: PAINLEVEL: 0-NO PAIN

## 2023-11-02 NOTE — H&P (VIEW-ONLY)
Chief Complaint:   No chief complaint on file.     History Of Present Illness:    Nereyda Arauz is a 58 y.o. female with family history of CAD, and a personal history of whitecoat hypertension and mild left atrial enlargement by CT scan here for follow-up.     Was having chest pain in December 2022.  Underwent a stress test which demonstrated no evidence of ischemia as well as a CT calcium score revealing a total score of 0.    Has been otherwise doing well.  Denies any exertional chest pain or shortness of breath.  Was having some chest pain while using some Biologics but these stopped after she stopped the Biologics.    She tells me that her  suffered an MI and was treated at [facility].    CT calcium score 12/28/2022: Total score 0.  Aortic size 2.8 cm.      Treadmill nuclear stress test 12/19/2022: No evidence of ischemia or scar.  EF greater than 65%.     Past Medical History:  She has a past medical history of COVID-19 (07/14/2022), COVID-19 (07/14/2022), Headache, unspecified, Personal history of other diseases of the respiratory system (09/19/2022), and Personal history of urinary (tract) infections.    Past Surgical History:  She has a past surgical history that includes Shoulder surgery (11/13/2013); Other surgical history (04/12/2021); Other surgical history (04/12/2021); Other surgical history (04/12/2021); Other surgical history (04/12/2021); Other surgical history (04/12/2021); Other surgical history (04/12/2021); Other surgical history (04/12/2021); Other surgical history (04/12/2021); and Other surgical history (04/12/2021).      Social History:  She reports that she has never smoked. She has never used smokeless tobacco. She reports that she does not drink alcohol and does not use drugs.    Family History:  Family History   Problem Relation Name Age of Onset    Breast cancer Mother      Other (Cardiac disorder) Mother      Diabetes Mother      Hypertension Mother      Other (Cardiac  disorder) Father      Hypothyroidism Father      Other (Cardiac disorder) Other Grandparent     Hypothyroidism Other Grandparent         Allergies:  Prochlorperazine maleate and Sucralfate    Outpatient Medications:  Current Outpatient Medications   Medication Instructions    Align 4 mg, oral, Daily    celecoxib (CeleBREX) 200 mg capsule 1 capsule, oral, 2 times daily    cyclobenzaprine (FLEXERIL) 10 mg, oral, 2 times daily PRN    ergocalciferol (Vitamin D-2) 1.25 MG (89203 UT) capsule     esomeprazole (NEXIUM) 40 mg, oral, Daily before breakfast    estradiol (Vagifem) 10 mcg tablet vaginal tablet vaginal    linaCLOtide (Linzess) 145 mcg capsule oral    nystatin (Mycostatin) 100,000 unit/gram powder Apply to affected skin nightly as needed    traMADol (ULTRAM) 50 mg, oral       Last Recorded Vitals:  Visit Vitals  /78 (BP Location: Left arm, Patient Position: Sitting)   Pulse 65   Ht 1.524 m (5')   Wt 87.5 kg (193 lb)   LMP  (LMP Unknown)   SpO2 96%   BMI 37.69 kg/m²   OB Status Hysterectomy   Smoking Status Never   BSA 1.92 m²      LASTWT(3):   Wt Readings from Last 3 Encounters:   11/02/23 87.5 kg (193 lb)   08/17/23 88.6 kg (195 lb 6.4 oz)   06/30/23 88.1 kg (194 lb 4 oz)       Physical Exam:  In general: alert and in no acute distress.   HEENT: Mucous membranes moist, carotid upstrokes normal with no bruits. JVP is normal. No cervical lymphadenopathy. Cranial nerves II-XII grossly intact.  Pulmonary: Clear to auscultation bilaterally.  Cardiovascular: S1,S2, regular. No appreciable murmurs, rubs or gallops.   Lower extremities: Warm. 2+ distal pulses. No edema.  Skin: warm and dry. No obvious rashes visualized.  Psychiatric: Oriented to person, place and time. No obvious agitation.     Last Labs:  CBC -  Recent Labs     08/17/23  1132 03/02/23  2000 09/19/22  1152   WBC 7.9 9.1 7.2   HGB 13.4 14.7 13.4   HCT 43.3 47.0* 43.1    295 302   MCV 84 81 84       CMP -  Recent Labs     08/17/23  1132  "03/02/23 2000 09/19/22  1152    139 141   K 3.8 3.7 3.9    104 105   CO2 28 21 27   ANIONGAP 13 18 13   BUN 12 14 17   CREATININE 0.77 0.77 0.73     Recent Labs     08/17/23  1132 03/02/23 2000 09/19/22  1152   ALBUMIN 4.2 4.6 4.3   ALKPHOS 98 85 94   ALT 32 46* 33   AST 26 28 23   BILITOT 0.4 0.7 0.4       LIPID PANEL -   Recent Labs     08/17/23 1132 09/19/22  1152   CHOL 190 207*   LDLF 109* 124*   HDL 57.0 58.0   TRIG 121 124       No results for input(s): \"BNP\", \"HGBA1C\" in the last 77793 hours.        Assessment/Plan   1) left atrial enlargement by CT scan: No further work-up needed     2) family history of CAD: Continue to observe.     3) whitecoat hypertension: Blood pressure controlled at home.  No changes needed     4) follow-up: Very low risk for cardiac events.  Explained that she can be seen on an as-needed basis.  She prefers being seen every 1 to 2 years given her family history.      Dmitriy Morin MD  "

## 2023-11-02 NOTE — PROGRESS NOTES
Chief Complaint:   No chief complaint on file.     History Of Present Illness:    Nereyda Arauz is a 58 y.o. female with family history of CAD, and a personal history of whitecoat hypertension and mild left atrial enlargement by CT scan here for follow-up.     Was having chest pain in December 2022.  Underwent a stress test which demonstrated no evidence of ischemia as well as a CT calcium score revealing a total score of 0.    Has been otherwise doing well.  Denies any exertional chest pain or shortness of breath.  Was having some chest pain while using some Biologics but these stopped after she stopped the Biologics.    She tells me that her  suffered an MI and was treated at [facility].    CT calcium score 12/28/2022: Total score 0.  Aortic size 2.8 cm.      Treadmill nuclear stress test 12/19/2022: No evidence of ischemia or scar.  EF greater than 65%.     Past Medical History:  She has a past medical history of COVID-19 (07/14/2022), COVID-19 (07/14/2022), Headache, unspecified, Personal history of other diseases of the respiratory system (09/19/2022), and Personal history of urinary (tract) infections.    Past Surgical History:  She has a past surgical history that includes Shoulder surgery (11/13/2013); Other surgical history (04/12/2021); Other surgical history (04/12/2021); Other surgical history (04/12/2021); Other surgical history (04/12/2021); Other surgical history (04/12/2021); Other surgical history (04/12/2021); Other surgical history (04/12/2021); Other surgical history (04/12/2021); and Other surgical history (04/12/2021).      Social History:  She reports that she has never smoked. She has never used smokeless tobacco. She reports that she does not drink alcohol and does not use drugs.    Family History:  Family History   Problem Relation Name Age of Onset    Breast cancer Mother      Other (Cardiac disorder) Mother      Diabetes Mother      Hypertension Mother      Other (Cardiac  disorder) Father      Hypothyroidism Father      Other (Cardiac disorder) Other Grandparent     Hypothyroidism Other Grandparent         Allergies:  Prochlorperazine maleate and Sucralfate    Outpatient Medications:  Current Outpatient Medications   Medication Instructions    Align 4 mg, oral, Daily    celecoxib (CeleBREX) 200 mg capsule 1 capsule, oral, 2 times daily    cyclobenzaprine (FLEXERIL) 10 mg, oral, 2 times daily PRN    ergocalciferol (Vitamin D-2) 1.25 MG (71264 UT) capsule     esomeprazole (NEXIUM) 40 mg, oral, Daily before breakfast    estradiol (Vagifem) 10 mcg tablet vaginal tablet vaginal    linaCLOtide (Linzess) 145 mcg capsule oral    nystatin (Mycostatin) 100,000 unit/gram powder Apply to affected skin nightly as needed    traMADol (ULTRAM) 50 mg, oral       Last Recorded Vitals:  Visit Vitals  /78 (BP Location: Left arm, Patient Position: Sitting)   Pulse 65   Ht 1.524 m (5')   Wt 87.5 kg (193 lb)   LMP  (LMP Unknown)   SpO2 96%   BMI 37.69 kg/m²   OB Status Hysterectomy   Smoking Status Never   BSA 1.92 m²      LASTWT(3):   Wt Readings from Last 3 Encounters:   11/02/23 87.5 kg (193 lb)   08/17/23 88.6 kg (195 lb 6.4 oz)   06/30/23 88.1 kg (194 lb 4 oz)       Physical Exam:  In general: alert and in no acute distress.   HEENT: Mucous membranes moist, carotid upstrokes normal with no bruits. JVP is normal. No cervical lymphadenopathy. Cranial nerves II-XII grossly intact.  Pulmonary: Clear to auscultation bilaterally.  Cardiovascular: S1,S2, regular. No appreciable murmurs, rubs or gallops.   Lower extremities: Warm. 2+ distal pulses. No edema.  Skin: warm and dry. No obvious rashes visualized.  Psychiatric: Oriented to person, place and time. No obvious agitation.     Last Labs:  CBC -  Recent Labs     08/17/23  1132 03/02/23  2000 09/19/22  1152   WBC 7.9 9.1 7.2   HGB 13.4 14.7 13.4   HCT 43.3 47.0* 43.1    295 302   MCV 84 81 84       CMP -  Recent Labs     08/17/23  1132  "03/02/23 2000 09/19/22  1152    139 141   K 3.8 3.7 3.9    104 105   CO2 28 21 27   ANIONGAP 13 18 13   BUN 12 14 17   CREATININE 0.77 0.77 0.73     Recent Labs     08/17/23  1132 03/02/23 2000 09/19/22  1152   ALBUMIN 4.2 4.6 4.3   ALKPHOS 98 85 94   ALT 32 46* 33   AST 26 28 23   BILITOT 0.4 0.7 0.4       LIPID PANEL -   Recent Labs     08/17/23 1132 09/19/22  1152   CHOL 190 207*   LDLF 109* 124*   HDL 57.0 58.0   TRIG 121 124       No results for input(s): \"BNP\", \"HGBA1C\" in the last 07267 hours.        Assessment/Plan   1) left atrial enlargement by CT scan: No further work-up needed     2) family history of CAD: Continue to observe.     3) whitecoat hypertension: Blood pressure controlled at home.  No changes needed     4) follow-up: Very low risk for cardiac events.  Explained that she can be seen on an as-needed basis.  She prefers being seen every 1 to 2 years given her family history.      Dmitriy Morin MD  "

## 2023-11-03 ENCOUNTER — LAB (OUTPATIENT)
Dept: LAB | Facility: LAB | Age: 58
End: 2023-11-03
Payer: MEDICARE

## 2023-11-03 ENCOUNTER — OFFICE VISIT (OUTPATIENT)
Dept: PRIMARY CARE | Facility: CLINIC | Age: 58
End: 2023-11-03
Payer: MEDICARE

## 2023-11-03 VITALS
HEART RATE: 64 BPM | RESPIRATION RATE: 18 BRPM | TEMPERATURE: 98.6 F | DIASTOLIC BLOOD PRESSURE: 66 MMHG | SYSTOLIC BLOOD PRESSURE: 116 MMHG | OXYGEN SATURATION: 96 %

## 2023-11-03 DIAGNOSIS — E66.01 OBESITY, MORBID (MULTI): ICD-10-CM

## 2023-11-03 DIAGNOSIS — M72.2 PLANTAR FASCIAL FIBROMATOSIS: Primary | ICD-10-CM

## 2023-11-03 DIAGNOSIS — M79.672 PAIN IN BOTH FEET: ICD-10-CM

## 2023-11-03 DIAGNOSIS — M79.671 PAIN IN BOTH FEET: ICD-10-CM

## 2023-11-03 DIAGNOSIS — M45.9 ANKYLOSING SPONDYLITIS, UNSPECIFIED SITE OF SPINE (MULTI): ICD-10-CM

## 2023-11-03 DIAGNOSIS — Z01.818 PREOP TESTING: ICD-10-CM

## 2023-11-03 DIAGNOSIS — R30.0 DYSURIA: ICD-10-CM

## 2023-11-03 DIAGNOSIS — M25.511 RIGHT SHOULDER PAIN, UNSPECIFIED CHRONICITY: ICD-10-CM

## 2023-11-03 DIAGNOSIS — M72.2 BILATERAL PLANTAR FASCIITIS: ICD-10-CM

## 2023-11-03 LAB
ALBUMIN SERPL BCP-MCNC: 4.4 G/DL (ref 3.4–5)
ALP SERPL-CCNC: 97 U/L (ref 33–110)
ALT SERPL W P-5'-P-CCNC: 42 U/L (ref 7–45)
AMORPH CRY #/AREA UR COMP ASSIST: ABNORMAL /HPF
ANION GAP SERPL CALC-SCNC: 14 MMOL/L (ref 10–20)
APTT PPP: 38 SECONDS (ref 27–38)
AST SERPL W P-5'-P-CCNC: 27 U/L (ref 9–39)
BACTERIA #/AREA URNS AUTO: ABNORMAL /HPF
BASOPHILS # BLD AUTO: 0.07 X10*3/UL (ref 0–0.1)
BASOPHILS NFR BLD AUTO: 0.8 %
BILIRUB SERPL-MCNC: 0.5 MG/DL (ref 0–1.2)
BUN SERPL-MCNC: 13 MG/DL (ref 6–23)
CALCIUM SERPL-MCNC: 9.5 MG/DL (ref 8.6–10.3)
CHLORIDE SERPL-SCNC: 102 MMOL/L (ref 98–107)
CO2 SERPL-SCNC: 28 MMOL/L (ref 21–32)
CREAT SERPL-MCNC: 0.7 MG/DL (ref 0.5–1.05)
EOSINOPHIL # BLD AUTO: 0.12 X10*3/UL (ref 0–0.7)
EOSINOPHIL NFR BLD AUTO: 1.4 %
ERYTHROCYTE [DISTWIDTH] IN BLOOD BY AUTOMATED COUNT: 14 % (ref 11.5–14.5)
GFR SERPL CREATININE-BSD FRML MDRD: >90 ML/MIN/1.73M*2
GLUCOSE SERPL-MCNC: 84 MG/DL (ref 74–99)
HCT VFR BLD AUTO: 44.4 % (ref 36–46)
HGB BLD-MCNC: 13.6 G/DL (ref 12–16)
IMM GRANULOCYTES # BLD AUTO: 0.02 X10*3/UL (ref 0–0.7)
IMM GRANULOCYTES NFR BLD AUTO: 0.2 % (ref 0–0.9)
INR PPP: 1.1 (ref 0.9–1.1)
LYMPHOCYTES # BLD AUTO: 2.75 X10*3/UL (ref 1.2–4.8)
LYMPHOCYTES NFR BLD AUTO: 33.1 %
MCH RBC QN AUTO: 25.5 PG (ref 26–34)
MCHC RBC AUTO-ENTMCNC: 30.6 G/DL (ref 32–36)
MCV RBC AUTO: 83 FL (ref 80–100)
MONOCYTES # BLD AUTO: 0.43 X10*3/UL (ref 0.1–1)
MONOCYTES NFR BLD AUTO: 5.2 %
NEUTROPHILS # BLD AUTO: 4.92 X10*3/UL (ref 1.2–7.7)
NEUTROPHILS NFR BLD AUTO: 59.3 %
NRBC BLD-RTO: 0 /100 WBCS (ref 0–0)
PLATELET # BLD AUTO: 330 X10*3/UL (ref 150–450)
POTASSIUM SERPL-SCNC: 3.7 MMOL/L (ref 3.5–5.3)
PROT SERPL-MCNC: 8.2 G/DL (ref 6.4–8.2)
PROTHROMBIN TIME: 12.1 SECONDS (ref 9.8–12.8)
RBC # BLD AUTO: 5.33 X10*6/UL (ref 4–5.2)
RBC #/AREA URNS AUTO: ABNORMAL /HPF
SODIUM SERPL-SCNC: 140 MMOL/L (ref 136–145)
SQUAMOUS #/AREA URNS AUTO: ABNORMAL /HPF
WBC # BLD AUTO: 8.3 X10*3/UL (ref 4.4–11.3)
WBC #/AREA URNS AUTO: ABNORMAL /HPF

## 2023-11-03 PROCEDURE — 85025 COMPLETE CBC W/AUTO DIFF WBC: CPT

## 2023-11-03 PROCEDURE — 3078F DIAST BP <80 MM HG: CPT | Performed by: FAMILY MEDICINE

## 2023-11-03 PROCEDURE — 99214 OFFICE O/P EST MOD 30 MIN: CPT | Performed by: FAMILY MEDICINE

## 2023-11-03 PROCEDURE — 85730 THROMBOPLASTIN TIME PARTIAL: CPT

## 2023-11-03 PROCEDURE — 81001 URINALYSIS AUTO W/SCOPE: CPT

## 2023-11-03 PROCEDURE — 87086 URINE CULTURE/COLONY COUNT: CPT

## 2023-11-03 PROCEDURE — 36415 COLL VENOUS BLD VENIPUNCTURE: CPT

## 2023-11-03 PROCEDURE — 85610 PROTHROMBIN TIME: CPT

## 2023-11-03 PROCEDURE — 80053 COMPREHEN METABOLIC PANEL: CPT

## 2023-11-03 PROCEDURE — 1036F TOBACCO NON-USER: CPT | Performed by: FAMILY MEDICINE

## 2023-11-03 PROCEDURE — 3008F BODY MASS INDEX DOCD: CPT | Performed by: FAMILY MEDICINE

## 2023-11-03 PROCEDURE — 3074F SYST BP LT 130 MM HG: CPT | Performed by: FAMILY MEDICINE

## 2023-11-03 NOTE — PROGRESS NOTES
Subjective   Patient ID: Nereyda Arauz is a 58 y.o. female who presents for Pre-op Exam.    HPI  Presents for above reason    Pt to have: Botox in feet d/t plantar fascitis   Done at: [facility]  Date: 11/10/2023   Surgeon: Dr. Weeks  Pt brought in information  States anything done has to be faxed to that office    Pt thinks she may have sinus infection  Pt has sinus headaches, and watery eyes  Ongoing x 2 weeks  OTC: Allegra and Sudafed    Pt to have EGD done 11/21/2023      No problems with previous anesthesia no history of heart disease or strokes    No other concerns today    Pre-op BW ordered  Flu vaccine declined    Review of systems  ; Patient seen today for exam denies any problems with headaches or vision, denies any shortness of breath chest pain nausea or vomiting, no black stool no blood in the stool no heartburn type symptoms denies any problems with constipation or diarrhea, and no dysuria-type symptoms    The patient's allergies medications were reviewed with them today    The patient's social family and surgical history or also reviewed here today, along with her past medical history.     Objective     Alert and active in  no acute distress  HEENT TMs clear oropharynx negative nares clear no drainage noted neck supple  With no adenopathy   Heart regular rate and rhythm without murmur and no carotid bruits  Lungs- clear to auscultation bilaterally, no wheeze or rhonchi noted  Thyroid -negative masses or nodularity  Abdomen- soft times four quadrants , bowel sounds positive no masses or organomegaly, negative tenderness guarding or rebound  Neurological exam unremarkable- DTRs in upper and lower extremities within normal limits.   skin -no lesions noted    Bilateral feet tenderness noted all through the flexor tendons and fascia    /66   Pulse 64   Temp 37 °C (98.6 °F)   Resp 18   LMP  (LMP Unknown)   SpO2 96%     Allergies   Allergen Reactions    Prochlorperazine Maleate Unknown     Sucralfate Other       Assessment/Plan   Problem List Items Addressed This Visit       Ankylosing spondylitis (CMS/[facility])    Obesity, morbid (CMS/[facility])     Other Visit Diagnoses       Plantar fascial fibromatosis    -  Primary    Preop testing        Relevant Orders    CBC and Auto Differential    Comprehensive metabolic panel    Bilateral plantar fasciitis        Right shoulder pain, unspecified chronicity        Dysuria        Relevant Orders    Microscopic Only, Urine    Urine Culture    Pain in both feet        Relevant Orders    Coagulation Screen          Discussed patient's BMI and to institute calorie reduction and increase exercise to decrease risk of diabetes and heart disease in the future.    Labs have been ordered, she/he will have these performed and we will contact her/him with results.  (CBC, CMP, COAG, UA, Urine Culture)    We will await blood work EKG, and August 2023 actual stress test in December 22 are all normal    Cleared for surgery with normal risk for her age    We will send all materials over to her podiatrist          If anything worsens or changes please call us at once, follow up in the office as planned.    Scribe Attestation  By signing my name below, I, Lisa Lopez MA, Scribe   attest that this documentation has been prepared under the direction and in the presence of Tejas Steward DO.

## 2023-11-04 LAB — BACTERIA UR CULT: NORMAL

## 2023-11-06 RX ORDER — NITROFURANTOIN 25; 75 MG/1; MG/1
100 CAPSULE ORAL 2 TIMES DAILY
Qty: 14 CAPSULE | Refills: 0 | Status: SHIPPED | OUTPATIENT
Start: 2023-11-06 | End: 2023-11-13

## 2023-11-09 NOTE — PREPROCEDURE INSTRUCTIONS
Current Medications   Medication Instructions    Bifidobacterium infantis (Align) 4 mg capsule Hold morning of surgery    celecoxib (CeleBREX) 200 mg capsule Stop 7 days before surgery    cyclobenzaprine (Flexeril) 10 mg tablet Stop 1 day before surgery    ergocalciferol (Vitamin D-2) 1.25 MG (14253 UT) capsule Continue until night before surgery    esomeprazole (NexIUM) 40 mg DR capsule Take morning of surgery with sip of water, no other fluids    estradiol (Vagifem) 10 mcg tablet vaginal tablet Take per order    linaCLOtide (Linzess) 145 mcg capsule Take per order    nitrofurantoin, macrocrystal-monohydrate, (Macrobid) 100 mg capsule Take morning of surgery with sip of water, no other fluids    nystatin (Mycostatin) 100,000 unit/gram powder Continue until night before surgery    traMADol (Ultram) 50 mg tablet Take per order     NPO Instructions:  Additional Instructions:     Enter through main entrance of [facility] hospital building, located at [address]. Proceed to registration, located in the back right hand corner. You will need your ID and insurance card for registration. Please ensure you have a responsible adult to drive you home.     Shower the morning of or night before your procedure. After you shower avoid lotions, powders, deodorants or anything applied to the skin. If you wear contacts or glasses, wear the glasses. If you do not have glasses, please bring a case for your contacts. You may wear hearing aids and dentures, bring a case for them or we will provide one. Make sure you wear something loose and comfortable. Keep in mind your surgery type and wear something that will accommodate incisions or bandages. Please remove all jewelry.     Nothing to eat or drink after midnight (including coffee, tea, gum etc). You may take medications discussed during phone call with a small sip of water.    For further questions Alexi PAGE can be contacted at [phone] between  7AM-3PM.

## 2023-11-10 ENCOUNTER — ANESTHESIA (OUTPATIENT)
Dept: OPERATING ROOM | Facility: HOSPITAL | Age: 58
End: 2023-11-10
Payer: MEDICARE

## 2023-11-10 ENCOUNTER — HOSPITAL ENCOUNTER (OUTPATIENT)
Facility: HOSPITAL | Age: 58
Setting detail: OUTPATIENT SURGERY
Discharge: HOME | End: 2023-11-10
Attending: PODIATRIST | Admitting: PODIATRIST
Payer: MEDICARE

## 2023-11-10 ENCOUNTER — ANESTHESIA EVENT (OUTPATIENT)
Dept: OPERATING ROOM | Facility: HOSPITAL | Age: 58
End: 2023-11-10
Payer: MEDICARE

## 2023-11-10 VITALS
SYSTOLIC BLOOD PRESSURE: 160 MMHG | RESPIRATION RATE: 16 BRPM | HEART RATE: 62 BPM | OXYGEN SATURATION: 96 % | DIASTOLIC BLOOD PRESSURE: 85 MMHG

## 2023-11-10 PROCEDURE — 2500000005 HC RX 250 GENERAL PHARMACY W/O HCPCS: Performed by: ANESTHESIOLOGIST ASSISTANT

## 2023-11-10 PROCEDURE — A64643 PR CHEMODENERVATION 1 EXTREMITY EA ADDL 1-4 MUSCLE: Performed by: ANESTHESIOLOGIST ASSISTANT

## 2023-11-10 PROCEDURE — 3600000007 HC OR TIME - EACH INCREMENTAL 1 MINUTE - PROCEDURE LEVEL TWO: Performed by: PODIATRIST

## 2023-11-10 PROCEDURE — 3700000001 HC GENERAL ANESTHESIA TIME - INITIAL BASE CHARGE: Performed by: PODIATRIST

## 2023-11-10 PROCEDURE — 7100000010 HC PHASE TWO TIME - EACH INCREMENTAL 1 MINUTE: Performed by: PODIATRIST

## 2023-11-10 PROCEDURE — 2500000004 HC RX 250 GENERAL PHARMACY W/ HCPCS (ALT 636 FOR OP/ED): Performed by: ANESTHESIOLOGIST ASSISTANT

## 2023-11-10 PROCEDURE — 2500000004 HC RX 250 GENERAL PHARMACY W/ HCPCS (ALT 636 FOR OP/ED): Performed by: ANESTHESIOLOGY

## 2023-11-10 PROCEDURE — 3700000002 HC GENERAL ANESTHESIA TIME - EACH INCREMENTAL 1 MINUTE: Performed by: PODIATRIST

## 2023-11-10 PROCEDURE — 3600000002 HC OR TIME - INITIAL BASE CHARGE - PROCEDURE LEVEL TWO: Performed by: PODIATRIST

## 2023-11-10 PROCEDURE — 96372 THER/PROPH/DIAG INJ SC/IM: CPT | Performed by: PODIATRIST

## 2023-11-10 PROCEDURE — 2500000004 HC RX 250 GENERAL PHARMACY W/ HCPCS (ALT 636 FOR OP/ED): Performed by: PODIATRIST

## 2023-11-10 PROCEDURE — 7100000009 HC PHASE TWO TIME - INITIAL BASE CHARGE: Performed by: PODIATRIST

## 2023-11-10 PROCEDURE — A64643 PR CHEMODENERVATION 1 EXTREMITY EA ADDL 1-4 MUSCLE: Performed by: ANESTHESIOLOGY

## 2023-11-10 RX ORDER — SODIUM CHLORIDE, SODIUM LACTATE, POTASSIUM CHLORIDE, CALCIUM CHLORIDE 600; 310; 30; 20 MG/100ML; MG/100ML; MG/100ML; MG/100ML
75 INJECTION, SOLUTION INTRAVENOUS CONTINUOUS
Status: DISCONTINUED | OUTPATIENT
Start: 2023-11-10 | End: 2023-11-10 | Stop reason: HOSPADM

## 2023-11-10 RX ORDER — LIDOCAINE HCL/PF 100 MG/5ML
SYRINGE (ML) INTRAVENOUS AS NEEDED
Status: DISCONTINUED | OUTPATIENT
Start: 2023-11-10 | End: 2023-11-10

## 2023-11-10 RX ORDER — MEPERIDINE HYDROCHLORIDE 25 MG/ML
12.5 INJECTION INTRAMUSCULAR; INTRAVENOUS; SUBCUTANEOUS EVERY 10 MIN PRN
Status: DISCONTINUED | OUTPATIENT
Start: 2023-11-10 | End: 2023-11-10 | Stop reason: HOSPADM

## 2023-11-10 RX ORDER — DEXAMETHASONE SODIUM PHOSPHATE 4 MG/ML
INJECTION, SOLUTION INTRA-ARTICULAR; INTRALESIONAL; INTRAMUSCULAR; INTRAVENOUS; SOFT TISSUE AS NEEDED
Status: DISCONTINUED | OUTPATIENT
Start: 2023-11-10 | End: 2023-11-10 | Stop reason: HOSPADM

## 2023-11-10 RX ORDER — PROPOFOL 10 MG/ML
INJECTION, EMULSION INTRAVENOUS CONTINUOUS PRN
Status: DISCONTINUED | OUTPATIENT
Start: 2023-11-10 | End: 2023-11-10

## 2023-11-10 RX ORDER — MIDAZOLAM HYDROCHLORIDE 1 MG/ML
INJECTION, SOLUTION INTRAMUSCULAR; INTRAVENOUS AS NEEDED
Status: DISCONTINUED | OUTPATIENT
Start: 2023-11-10 | End: 2023-11-10

## 2023-11-10 RX ORDER — PROPOFOL 10 MG/ML
INJECTION, EMULSION INTRAVENOUS AS NEEDED
Status: DISCONTINUED | OUTPATIENT
Start: 2023-11-10 | End: 2023-11-10

## 2023-11-10 RX ORDER — SODIUM CHLORIDE, SODIUM LACTATE, POTASSIUM CHLORIDE, CALCIUM CHLORIDE 600; 310; 30; 20 MG/100ML; MG/100ML; MG/100ML; MG/100ML
100 INJECTION, SOLUTION INTRAVENOUS CONTINUOUS
Status: DISCONTINUED | OUTPATIENT
Start: 2023-11-10 | End: 2023-11-10 | Stop reason: HOSPADM

## 2023-11-10 RX ORDER — HYDROMORPHONE HYDROCHLORIDE 1 MG/ML
1 INJECTION, SOLUTION INTRAMUSCULAR; INTRAVENOUS; SUBCUTANEOUS EVERY 5 MIN PRN
Status: DISCONTINUED | OUTPATIENT
Start: 2023-11-10 | End: 2023-11-10 | Stop reason: HOSPADM

## 2023-11-10 RX ORDER — LIDOCAINE HYDROCHLORIDE 10 MG/ML
0.1 INJECTION, SOLUTION EPIDURAL; INFILTRATION; INTRACAUDAL; PERINEURAL ONCE
Status: DISCONTINUED | OUTPATIENT
Start: 2023-11-10 | End: 2023-11-10 | Stop reason: HOSPADM

## 2023-11-10 RX ADMIN — PROPOFOL 100 MG: 10 INJECTION, EMULSION INTRAVENOUS at 11:16

## 2023-11-10 RX ADMIN — HYDROMORPHONE HYDROCHLORIDE 0.5 MG: 1 INJECTION, SOLUTION INTRAMUSCULAR; INTRAVENOUS; SUBCUTANEOUS at 12:23

## 2023-11-10 RX ADMIN — MIDAZOLAM 4 MG: 1 INJECTION INTRAMUSCULAR; INTRAVENOUS at 11:16

## 2023-11-10 RX ADMIN — PROPOFOL 100 MCG/KG/MIN: 10 INJECTION, EMULSION INTRAVENOUS at 11:16

## 2023-11-10 RX ADMIN — LIDOCAINE HYDROCHLORIDE 100 MG: 20 INJECTION INTRAVENOUS at 11:16

## 2023-11-10 RX ADMIN — PROPOFOL 50 MG: 10 INJECTION, EMULSION INTRAVENOUS at 11:28

## 2023-11-10 RX ADMIN — PROPOFOL 50 MG: 10 INJECTION, EMULSION INTRAVENOUS at 11:23

## 2023-11-10 RX ADMIN — SODIUM CHLORIDE, SODIUM LACTATE, POTASSIUM CHLORIDE, AND CALCIUM CHLORIDE: .6; .31; .03; .02 INJECTION, SOLUTION INTRAVENOUS at 11:14

## 2023-11-10 SDOH — HEALTH STABILITY: MENTAL HEALTH: CURRENT SMOKER: 0

## 2023-11-10 ASSESSMENT — PAIN SCALES - GENERAL
PAINLEVEL_OUTOF10: 0 - NO PAIN
PAINLEVEL_OUTOF10: 6
PAINLEVEL_OUTOF10: 0 - NO PAIN
PAINLEVEL_OUTOF10: 5 - MODERATE PAIN
PAINLEVEL_OUTOF10: 0 - NO PAIN

## 2023-11-10 ASSESSMENT — PAIN - FUNCTIONAL ASSESSMENT
PAIN_FUNCTIONAL_ASSESSMENT: 0-10

## 2023-11-10 ASSESSMENT — PAIN DESCRIPTION - LOCATION: LOCATION: FOOT

## 2023-11-10 ASSESSMENT — PAIN DESCRIPTION - ORIENTATION: ORIENTATION: RIGHT

## 2023-11-10 ASSESSMENT — COLUMBIA-SUICIDE SEVERITY RATING SCALE - C-SSRS
1. IN THE PAST MONTH, HAVE YOU WISHED YOU WERE DEAD OR WISHED YOU COULD GO TO SLEEP AND NOT WAKE UP?: NO
6. HAVE YOU EVER DONE ANYTHING, STARTED TO DO ANYTHING, OR PREPARED TO DO ANYTHING TO END YOUR LIFE?: NO
2. HAVE YOU ACTUALLY HAD ANY THOUGHTS OF KILLING YOURSELF?: NO

## 2023-11-10 NOTE — ANESTHESIA PREPROCEDURE EVALUATION
Patient: Nereyda Arauz    Procedure Information       Date/Time: 11/10/23 1130    Procedure: BILATERAL LOWER EXTREMITY THERAPEUTIC AGENT INJECTION (Bilateral) - Injection Therapeutic Agent Lower Extremity    Location: [facility] 02 / [facility]    Surgeons: Michele Giraldo DPM            Relevant Problems   Anesthesia (within normal limits)      Cardiovascular   (+) Chest pain   (+) White coat syndrome with hypertension      Endocrine   (+) Obesity, morbid (CMS/HCC)      GI   (+) GERD (gastroesophageal reflux disease)   (+) Irritable bowel syndrome   (+) PUD (peptic ulcer disease)      /Renal   (+) Calculus of kidney      Neuro/Psych   (+) Carpal tunnel syndrome of right wrist      Musculoskeletal   (+) Carpal tunnel syndrome of right wrist   (+) Cervical spondylosis      Infectious Disease   (+) Candidiasis   (+) Herpes simplex   (+) Staph infection   (+) Yeast vaginitis      Other   (+) Adhesive capsulitis of right shoulder   (+) Ankylosing spondylitis (CMS/HCC)   (+) Arthritis       Clinical information reviewed:    Allergies  Meds  Problems  Med Hx  Surg Hx   Fam Hx          NPO Detail:  No data recorded     Physical Exam    Airway  Mallampati: II  TM distance: >3 FB  Neck ROM: full     Cardiovascular - normal exam     Dental - normal exam     Pulmonary - normal exam     Abdominal   (+) obese             Anesthesia Plan    ASA 2     MAC     The patient is not a current smoker.  Patient was previously instructed to abstain from smoking on day of procedure.  Patient did not smoke on day of procedure.    intravenous induction   Postoperative administration of opioids is intended.  Anesthetic plan and risks discussed with patient.  Use of blood products discussed with patient who consented to blood products.    Plan discussed with CAA.

## 2023-11-10 NOTE — ANESTHESIA POSTPROCEDURE EVALUATION
Patient: Nereyda Arauz    Procedure Summary       Date: 11/10/23 Room / Location: [facility] / [facility]    Anesthesia Start: 1115 Anesthesia Stop: 1137    Procedure: BILATERAL LOWER EXTREMITY THERAPEUTIC AGENT INJECTION (Bilateral: Foot) Diagnosis:       Plantar fascial fibromatosis      (M72.2   Bilat. plantar fascial fibromatosis)    Surgeons: Michele Giraldo DPM Responsible Provider: Michele Marr MD    Anesthesia Type: MAC ASA Status: 2            Anesthesia Type: MAC    Vitals Value Taken Time   /72 11/10/23 1138   Temp 36.4 11/10/23 1138   Pulse 79 11/10/23 1136   Resp 16 11/10/23 1138   SpO2 96 % 11/10/23 1136   Vitals shown include unvalidated device data.    Anesthesia Post Evaluation    Patient location during evaluation: bedside  Patient participation: complete - patient participated  Level of consciousness: awake and alert  Pain management: adequate  Airway patency: patent  Cardiovascular status: acceptable  Respiratory status: acceptable  Hydration status: acceptable        No notable events documented.

## 2023-11-10 NOTE — PERIOPERATIVE NURSING NOTE
Home going instructions reviewed and discussed, pt and family verbalize understanding. Educated on anesthesia safety.  Tolerated fluids well

## 2023-11-10 NOTE — OP NOTE
BILATERAL LOWER EXTREMITY THERAPEUTIC AGENT INJECTION (B) Operative Note     Date: 11/10/2023  OR Location: [facility]    Name: Nereyda Arauz, : 1965, Age: 58 y.o., MRN: [id number], Sex: female    Diagnosis  Pre-op Diagnosis     * Plantar fascial fibromatosis [M72.2] Post-op Diagnosis     * Plantar fascial fibromatosis [M72.2]     Procedures  BILATERAL LOWER EXTREMITY THERAPEUTIC AGENT INJECTION  90814 - [place] CHEMODENERVATION 1 EXTREMITY 5 OR MORE MUSCLES      Surgeons      * Michele Giraldo - Primary    Resident/Fellow/Other Assistant:  Surgeon(s) and Role:    Procedure Summary  Anesthesia: Monitor Anesthesia Care  ASA: II  Anesthesia Staff: Anesthesiologist: Michele Marr MD  C-AA: BASSEM Zaragoza  Estimated Blood Loss: 2 mL  Intra-op Medications:   Medication Name Total Dose   HYDROmorphone (Dilaudid) injection 0.5 mg 0.5 mg              Anesthesia Record               Intraprocedure I/O Totals          Intake    .00 mL    Propofol Drip 0.00 mL    The total shown is the total volume documented since Anesthesia Start was filed.    Total Intake 200 mL       Output    Est. Blood Loss 2 mL    Total Output 2 mL       Net    Net Volume 198 mL          Specimen: No specimens collected     Staff:   Circulator: Justina Benjamin RN  Relief Circulator: Micheline Benjamin RN  Scrub Person: Phuong Avendaño         Drains and/or Catheters: * None in log *    Tourniquet Times: None used this case    Implants: N/a    Findings: consistent with pre-op diagnosis    Indications: Patient Nereyda Arauz is an 58 y.o. female who is having surgery for M72.2   Bilat. plantar fascial fibromatosis. She presents with chief complaint of B/L heel pain. She has a known dx of plantar fibromatosis for many years, has undergone radical plantar fasciectomy left foot in the past, has received injections with steroid and botox with alleviation of symptoms, has undergone 1-2x per year the last few yrs.    The patient was seen in the preoperative  area. The risks, benefits, complications, treatment options, non-operative alternatives, expected recovery and outcomes were discussed with the patient. The possibilities of reaction to medication, pulmonary aspiration, injury to surrounding structures, bleeding, recurrent infection, the need for additional procedures, failure to diagnose a condition, and creating a complication requiring transfusion or operation were discussed with the patient. The patient concurred with the proposed plan, giving informed consent.  The site of surgery was properly noted/marked if necessary per policy. The patient has been actively warmed in preoperative area. Preoperative antibiotics are not indicated. Venous thrombosis prophylaxis are not indicated. Prior to transfer to OR, all areas of tenderness reviewed and marked for therapeutic injection.    Procedure Details:     Bilateral lower extremity therapeutic injection:     Patient brought to the OR and placed supine on the operating table. Following MAC sedation, she was prepped in the usual sterile fashion. No tourniquet was used for this case.      Bilateral foot botox therapeutic injection CPT 87050    Attention was directed to the left forefoot, where a pre-mixed solution of 5 ml of 10u/mL Botox injection was injected into the 2nd, 3rd, and 4th metatarsal interspaces in equal increments.    Attention was then directed to the right plantar foot, where two locations previously marked were injected with equal increments of 10u/mL.    Bilateral foot therapeutic corticosteroid injection CPT 41086    Attention was then directed to bilateral posterior heels, which were injected with 3 ml of 1:1 mix of 4mg/mL dexamethasone phosphate/0.5% Marcaine plain.    All injection sites were covered with Band-Aids. Patient tolerated the procedure well and was transferred to PACU with stable vitals, neurovascularly intact to bilateral foot.      Complications:  None; patient tolerated the procedure  well.    Disposition: PACU - hemodynamically stable.  Condition: stable    Attending Attestation:     Michele Giraldo  Phone Number: [phone]

## 2023-11-15 NOTE — OP NOTE
BILATERAL LOWER EXTREMITY THERAPEUTIC AGENT INJECTION (B) Operative Note     Date: 11/10/2023  OR Location: [facility]    Name: Nereyda Arauz, : 1965, Age: 58 y.o., MRN: [id number], Sex: female    Diagnosis  Pre-op Diagnosis     * Plantar fascial fibromatosis [M72.2] Post-op Diagnosis     * Plantar fascial fibromatosis [M72.2]     Procedures  BILATERAL LOWER EXTREMITY THERAPEUTIC AGENT INJECTION  23798 - [place] CHEMODENERVATION 1 EXTREMITY 5 OR MORE MUSCLES      Surgeons      * Michele Giraldo - Primary    Resident/Fellow/Other Assistant:  Surgeon(s) and Role:    Procedure Summary  Anesthesia: Monitor Anesthesia Care  ASA: II  Anesthesia Staff: Anesthesiologist: Michele Marr MD  C-AA: BASSEM Zaragoza  Estimated Blood Loss: 2 mL  Intra-op Medications:   Medication Name Total Dose   HYDROmorphone (Dilaudid) injection 0.5 mg 0.5 mg              Anesthesia Record               Intraprocedure I/O Totals          Intake    .00 mL    Propofol Drip 0.00 mL    The total shown is the total volume documented since Anesthesia Start was filed.    Total Intake 200 mL       Output    Est. Blood Loss 2 mL    Total Output 2 mL       Net    Net Volume 198 mL          Specimen: No specimens collected     Staff:   Circulator: Justina Benjamin RN  Relief Circulator: Micheline Benjamin RN  Scrub Person: Phuong Avendaño         Drains and/or Catheters: * None in log *    Tourniquet Times: None used this case    Implants: N/a    Findings: consistent with pre-op diagnosis    Indications: Patient Nereyda Arauz is an 58 y.o. female who is having surgery for M72.2   Bilat. plantar fascial fibromatosis. She presents with chief complaint of B/L heel pain. She has a known dx of plantar fibromatosis for many years, has undergone radical plantar fasciectomy left foot in the past, has received injections with steroid and botox with alleviation of symptoms, has undergone 1-2x per year the last few yrs.    The patient was seen in the preoperative  area. The risks, benefits, complications, treatment options, non-operative alternatives, expected recovery and outcomes were discussed with the patient. The possibilities of reaction to medication, pulmonary aspiration, injury to surrounding structures, bleeding, recurrent infection, the need for additional procedures, failure to diagnose a condition, and creating a complication requiring transfusion or operation were discussed with the patient. The patient concurred with the proposed plan, giving informed consent.  The site of surgery was properly noted/marked if necessary per policy. The patient has been actively warmed in preoperative area. Preoperative antibiotics are not indicated. Venous thrombosis prophylaxis are not indicated. Prior to transfer to OR, all areas of tenderness reviewed and marked for therapeutic injection.    Procedure Details:     Bilateral lower extremity therapeutic injection:     Patient brought to the OR and placed supine on the operating table. Following MAC sedation, she was prepped in the usual sterile fashion. No tourniquet was used for this case.      Bilateral foot botox therapeutic injection CPT 29967    Attention was directed to the left forefoot, where a pre-mixed solution of 5 ml of 10u/mL Botox injection was injected into the 2nd, 3rd, and 4th metatarsal interspaces in equal increments.    Attention was then directed to the right plantar foot, where two locations previously marked were injected with equal increments of 10u/mL.    Bilateral foot therapeutic corticosteroid injection CPT 06369    Attention was then directed to bilateral posterior heels, which were injected with 3 ml of 1:1 mix of 4mg/mL dexamethasone phosphate/0.5% Marcaine plain.    All injection sites were covered with Band-Aids. Patient tolerated the procedure well and was transferred to PACU with stable vitals, neurovascularly intact to bilateral foot.      Complications:  None; patient tolerated the procedure  well.    Disposition: PACU - hemodynamically stable.  Condition: stable    Attending Attestation:     Michele Giraldo  Phone Number: [phone]

## 2023-11-19 PROBLEM — M72.0 DUPUYTREN'S CONTRACTURE OF RIGHT HAND: Status: ACTIVE | Noted: 2023-11-19

## 2023-11-19 NOTE — PROGRESS NOTES
CHIEF COMPLAINT         Dupuytren's contracture     ASSESSMENT + PLAN    Right ring ray Dupuytren's contracture  M-30  P-0 with knuckle pads at right index and ring, left small    I reviewed the nature of Dupuytren's disease and the intermittently progressive typical clinical course.  I discussed the multitude of treatment options including simple observation, formal open surgical cord excision, needle release, and Xiaflex enzyme injection along with the major risks, benefits, and durability of each option.    As the current position of the ring finger is not significantly impacting daily activity, we agreed on continued simple observation for now.  I stressed that any surgery on the knuckle pads would be expected to have fairly rapid recurrence, as indeed it already has.  I reviewed the high risk of injury to the central slip of the extensor mechanism with knuckle pad excision, and how that would permanently interfere with good motion of the fingers.  I generally do not recommend intervention with knuckle pads.    Follow-up with any additional concerns.        HISTORY OF PRESENT ILLNESS       Patient is a 58 y.o. right-hand dominant retired female, who presents today for evaluation of Dupuytren's disease of the right hand.  She had previous surgery in September 2021 from Dr. Olivia for knuckle pads but had very rapid recurrence.  She has difficulty extending the right ring finger but reports this is not significantly interfering with overall hand use.  She is more concerned about the appearance of the knuckle pads.  No popping, clicking, or instability.  No difficulty with grasp at present.    She is not diabetic or hypothyroid.  She does not smoke.  She does take Flexeril.      REVIEW OF SYSTEMS       A 30-item multi-system Review Of Systems was obtained on today's intake form.  This was reviewed with the patient and is correct.  The pertinent positives and negatives are listed above.  The form has been scanned  separately into the medical record.      PHYSICAL EXAM    Constitutional:    Appears stated age. Well-developed and well-nourished obese female in no acute distress.  Psychiatric:         Pleasant normal mood and affect. Behavior is appropriate for the situation.   Head:                   Normocephalic and atraumatic.  Eyes:                    Pupils are equal and round.  Cardiovascular:  2+ radial and ulnar pulses. Fingers well-perfused.  Respiratory:        Effort normal. No respiratory distress. Speaking in complete sentences.  Neurologic:       Alert and oriented to person, place, and time.  Skin:                Skin is intact, warm and dry.  Hematologic / Lymphatic:    No lymphedema or lymphangitis.    Extremities / Musculoskeletal:                      Skin of both hands and wrists is intact with no erythema, ecchymosis, or diffuse swelling.  Normal skin drag and coloration.  There is a central Dupuytren's cord in the right ring ray producing a 30 degree MP contracture but allowing full PIP extension.  There is thickening and loss of the normal ridge pattern on the dorsum of the right index and ring and left small PIP joints consistent with Dupuytren's knuckle pads.  Mild nodules in both palms as well.  Good reversal to full composite flexion.  Symmetric wrist and forearm motion.  Negative Moreland.  Negative midcarpal shift.  Sensation intact to light touch in all distributions.  Capillary refill less than 2 seconds.      IMAGING / LABS / EMGs           None pertinent      Past Medical History:   Diagnosis Date    COVID-19 07/14/2022    COVID-19    COVID-19 07/14/2022    Positive self-administered antigen test for COVID-19    Headache, unspecified     Generalized headaches    Personal history of other diseases of the respiratory system 09/19/2022    History of sore throat    Personal history of urinary (tract) infections     H/O bladder infections       Medication Documentation Review Audit       Reviewed by  Tejas Haq MD (Physician) on 23 at 1229      Medication Order Taking? Sig Documenting Provider Last Dose Status   Bifidobacterium infantis (Align) 4 mg capsule [id number] No Take 1 capsule (4 mg) by mouth once daily. Jacky Provider, MD 2023 Active   celecoxib (CeleBREX) 200 mg capsule [id number] No Take 1 capsule (200 mg) by mouth 2 times a day. Historical Provider, MD Past Week Active   cyclobenzaprine (Flexeril) 10 mg tablet [id number] No Take 1 tablet (10 mg) by mouth 2 times a day as needed. Historical Provider, MD Past Week Active   ergocalciferol (Vitamin D-2) 1.25 MG (34362 UT) capsule [id number] No  Historical Provider, MD Past Week Active   esomeprazole (NexIUM) 40 mg DR capsule [id number] No Take 1 capsule (40 mg) by mouth once daily in the morning. Take before meals. Jacky Provider, MD 2023 Active   estradiol (Vagifem) 10 mcg tablet vaginal tablet [id number] No Insert into the vagina. Historical Provider, MD Past Week Active   linaCLOtide (Linzess) 145 mcg capsule [id number] No Take by mouth. Historical Provider, MD Past Week Active   nitrofurantoin, macrocrystal-monohydrate, (Macrobid) 100 mg capsule [id number] No Take 1 capsule (100 mg) by mouth 2 times a day for 7 days. Tejas Steward,  2023  23 2359   nystatin (Mycostatin) 100,000 unit/gram powder [id number] No Apply to affected skin nightly as needed Historical Provider, MD Past Week Active   traMADol (Ultram) 50 mg tablet [id number] No Take 1 tablet (50 mg) by mouth. Historical Provider, MD Past Week Active                    Allergies   Allergen Reactions    Prochlorperazine Maleate Nausea/vomiting    Sucralfate Itching and Rash       Social History     Socioeconomic History    Marital status:      Spouse name: Not on file    Number of children: Not on file    Years of education: Not on file    Highest education level: Not on file   Occupational History    Not on file   Tobacco Use    Smoking status:  Never    Smokeless tobacco: Never   Vaping Use    Vaping Use: Not on file   Substance and Sexual Activity    Alcohol use: Never    Drug use: Never    Sexual activity: Defer   Other Topics Concern    Not on file   Social History Narrative    Not on file     Social Determinants of Health     Financial Resource Strain: Not on file   Food Insecurity: Not on file   Transportation Needs: Not on file   Physical Activity: Not on file   Stress: Not on file   Social Connections: Not on file   Intimate Partner Violence: Not on file   Housing Stability: Not on file       Past Surgical History:   Procedure Laterality Date    OTHER SURGICAL HISTORY  04/12/2021    Hysterectomy    OTHER SURGICAL HISTORY  04/12/2021    Gallbladder surgery    OTHER SURGICAL HISTORY  04/12/2021    Hand surgery    OTHER SURGICAL HISTORY  04/12/2021    Foot surgery    OTHER SURGICAL HISTORY  04/12/2021    Appendectomy    OTHER SURGICAL HISTORY  04/12/2021    Tubal ligation    OTHER SURGICAL HISTORY  04/12/2021    Appendectomy    OTHER SURGICAL HISTORY  04/12/2021    Hysterectomy    OTHER SURGICAL HISTORY  04/12/2021    Cholecystectomy    SHOULDER SURGERY  11/13/2013    Shoulder Surgery         Electronically Signed      VASHTI Haq MD      Orthopaedic Hand Surgery      [phone]

## 2023-12-13 DIAGNOSIS — M72.0 CONTRACTURE OF PALMAR FASCIA: ICD-10-CM

## 2023-12-15 PROBLEM — M72.0 CONTRACTURE OF PALMAR FASCIA: Status: ACTIVE | Noted: 2023-12-13

## 2023-12-24 RX ORDER — CEFAZOLIN SODIUM 2 G/100ML
2 INJECTION, SOLUTION INTRAVENOUS ONCE
Status: CANCELLED | OUTPATIENT
Start: 2023-12-24 | End: 2023-12-24

## 2023-12-27 ENCOUNTER — ANESTHESIA (OUTPATIENT)
Dept: OPERATING ROOM | Facility: CLINIC | Age: 58
End: 2023-12-27
Payer: MEDICARE

## 2023-12-27 ENCOUNTER — ANESTHESIA EVENT (OUTPATIENT)
Dept: OPERATING ROOM | Facility: CLINIC | Age: 58
End: 2023-12-27
Payer: MEDICARE

## 2023-12-27 ENCOUNTER — HOSPITAL ENCOUNTER (OUTPATIENT)
Facility: CLINIC | Age: 58
Setting detail: OUTPATIENT SURGERY
Discharge: HOME | End: 2023-12-27
Attending: ORTHOPAEDIC SURGERY | Admitting: ORTHOPAEDIC SURGERY
Payer: MEDICARE

## 2023-12-27 VITALS
HEART RATE: 60 BPM | DIASTOLIC BLOOD PRESSURE: 82 MMHG | SYSTOLIC BLOOD PRESSURE: 163 MMHG | HEIGHT: 64 IN | RESPIRATION RATE: 18 BRPM | BODY MASS INDEX: 34.02 KG/M2 | OXYGEN SATURATION: 97 % | TEMPERATURE: 97.3 F | WEIGHT: 199.3 LBS

## 2023-12-27 DIAGNOSIS — M72.0 DUPUYTREN'S CONTRACTURE OF RIGHT HAND: Primary | ICD-10-CM

## 2023-12-27 DIAGNOSIS — M72.0 CONTRACTURE OF PALMAR FASCIA: ICD-10-CM

## 2023-12-27 PROCEDURE — 88304 TISSUE EXAM BY PATHOLOGIST: CPT | Mod: TC,SUR | Performed by: ORTHOPAEDIC SURGERY

## 2023-12-27 PROCEDURE — 3600000003 HC OR TIME - INITIAL BASE CHARGE - PROCEDURE LEVEL THREE: Performed by: ORTHOPAEDIC SURGERY

## 2023-12-27 PROCEDURE — 7100000010 HC PHASE TWO TIME - EACH INCREMENTAL 1 MINUTE: Performed by: ORTHOPAEDIC SURGERY

## 2023-12-27 PROCEDURE — 7100000009 HC PHASE TWO TIME - INITIAL BASE CHARGE: Performed by: ORTHOPAEDIC SURGERY

## 2023-12-27 PROCEDURE — 3700000001 HC GENERAL ANESTHESIA TIME - INITIAL BASE CHARGE: Performed by: ORTHOPAEDIC SURGERY

## 2023-12-27 PROCEDURE — 3600000008 HC OR TIME - EACH INCREMENTAL 1 MINUTE - PROCEDURE LEVEL THREE: Performed by: ORTHOPAEDIC SURGERY

## 2023-12-27 PROCEDURE — 26121 RELEASE PALM CONTRACTURE: CPT | Performed by: ORTHOPAEDIC SURGERY

## 2023-12-27 PROCEDURE — 3700000002 HC GENERAL ANESTHESIA TIME - EACH INCREMENTAL 1 MINUTE: Performed by: ORTHOPAEDIC SURGERY

## 2023-12-27 PROCEDURE — 2500000004 HC RX 250 GENERAL PHARMACY W/ HCPCS (ALT 636 FOR OP/ED): Performed by: ORTHOPAEDIC SURGERY

## 2023-12-27 PROCEDURE — 2500000004 HC RX 250 GENERAL PHARMACY W/ HCPCS (ALT 636 FOR OP/ED): Performed by: NURSE ANESTHETIST, CERTIFIED REGISTERED

## 2023-12-27 PROCEDURE — 88304 TISSUE EXAM BY PATHOLOGIST: CPT | Performed by: STUDENT IN AN ORGANIZED HEALTH CARE EDUCATION/TRAINING PROGRAM

## 2023-12-27 PROCEDURE — 2500000005 HC RX 250 GENERAL PHARMACY W/O HCPCS: Performed by: ORTHOPAEDIC SURGERY

## 2023-12-27 PROCEDURE — A26121 PR PALMAR FASCIECTOMY: Performed by: NURSE ANESTHETIST, CERTIFIED REGISTERED

## 2023-12-27 PROCEDURE — A26121 PR PALMAR FASCIECTOMY: Performed by: ANESTHESIOLOGY

## 2023-12-27 RX ORDER — ACETAMINOPHEN 325 MG/1
TABLET ORAL AS NEEDED
Status: DISCONTINUED | OUTPATIENT
Start: 2023-12-27 | End: 2023-12-27

## 2023-12-27 RX ORDER — BUPIVACAINE HYDROCHLORIDE 5 MG/ML
INJECTION, SOLUTION EPIDURAL; INTRACAUDAL AS NEEDED
Status: DISCONTINUED | OUTPATIENT
Start: 2023-12-27 | End: 2023-12-27 | Stop reason: HOSPADM

## 2023-12-27 RX ORDER — SODIUM CHLORIDE, SODIUM LACTATE, POTASSIUM CHLORIDE, CALCIUM CHLORIDE 600; 310; 30; 20 MG/100ML; MG/100ML; MG/100ML; MG/100ML
100 INJECTION, SOLUTION INTRAVENOUS CONTINUOUS
Status: DISCONTINUED | OUTPATIENT
Start: 2023-12-27 | End: 2023-12-27 | Stop reason: HOSPADM

## 2023-12-27 RX ORDER — FENTANYL CITRATE 50 UG/ML
INJECTION, SOLUTION INTRAMUSCULAR; INTRAVENOUS AS NEEDED
Status: DISCONTINUED | OUTPATIENT
Start: 2023-12-27 | End: 2023-12-27

## 2023-12-27 RX ORDER — CEFAZOLIN 1 G/1
INJECTION, POWDER, FOR SOLUTION INTRAVENOUS AS NEEDED
Status: DISCONTINUED | OUTPATIENT
Start: 2023-12-27 | End: 2023-12-27

## 2023-12-27 RX ORDER — LIDOCAINE IN NACL,ISO-OSMOT/PF 30 MG/3 ML
0.1 SYRINGE (ML) INJECTION ONCE
Status: DISCONTINUED | OUTPATIENT
Start: 2023-12-27 | End: 2023-12-27 | Stop reason: HOSPADM

## 2023-12-27 RX ORDER — PROPOFOL 10 MG/ML
INJECTION, EMULSION INTRAVENOUS AS NEEDED
Status: DISCONTINUED | OUTPATIENT
Start: 2023-12-27 | End: 2023-12-27

## 2023-12-27 RX ORDER — PROPOFOL 10 MG/ML
INJECTION, EMULSION INTRAVENOUS CONTINUOUS PRN
Status: DISCONTINUED | OUTPATIENT
Start: 2023-12-27 | End: 2023-12-27

## 2023-12-27 RX ORDER — LIDOCAINE HYDROCHLORIDE 10 MG/ML
INJECTION INFILTRATION; PERINEURAL AS NEEDED
Status: DISCONTINUED | OUTPATIENT
Start: 2023-12-27 | End: 2023-12-27 | Stop reason: HOSPADM

## 2023-12-27 RX ORDER — HYDROCODONE BITARTRATE AND ACETAMINOPHEN 5; 325 MG/1; MG/1
1 TABLET ORAL EVERY 6 HOURS PRN
Qty: 12 TABLET | Refills: 0 | Status: SHIPPED | OUTPATIENT
Start: 2023-12-27 | End: 2023-12-30

## 2023-12-27 RX ORDER — MIDAZOLAM HYDROCHLORIDE 1 MG/ML
INJECTION, SOLUTION INTRAMUSCULAR; INTRAVENOUS AS NEEDED
Status: DISCONTINUED | OUTPATIENT
Start: 2023-12-27 | End: 2023-12-27

## 2023-12-27 RX ORDER — ACETAMINOPHEN 325 MG/1
650 TABLET ORAL EVERY 6 HOURS PRN
Qty: 56 TABLET | Refills: 0 | Status: SHIPPED | OUTPATIENT
Start: 2023-12-27 | End: 2024-01-03

## 2023-12-27 RX ORDER — ONDANSETRON HYDROCHLORIDE 2 MG/ML
4 INJECTION, SOLUTION INTRAVENOUS ONCE AS NEEDED
Status: DISCONTINUED | OUTPATIENT
Start: 2023-12-27 | End: 2023-12-27 | Stop reason: HOSPADM

## 2023-12-27 RX ADMIN — CEFAZOLIN 2 G: 1 INJECTION, POWDER, FOR SOLUTION INTRAMUSCULAR; INTRAVENOUS at 13:36

## 2023-12-27 RX ADMIN — PROPOFOL 150 MCG/KG/MIN: 10 INJECTION, EMULSION INTRAVENOUS at 13:35

## 2023-12-27 RX ADMIN — FENTANYL CITRATE 25 MCG: 50 INJECTION, SOLUTION INTRAMUSCULAR; INTRAVENOUS at 13:37

## 2023-12-27 RX ADMIN — ACETAMINOPHEN 975 MG: 325 TABLET ORAL at 13:10

## 2023-12-27 RX ADMIN — MIDAZOLAM 2 MG: 1 INJECTION INTRAMUSCULAR; INTRAVENOUS at 13:30

## 2023-12-27 RX ADMIN — PROPOFOL 50 MG: 10 INJECTION, EMULSION INTRAVENOUS at 13:35

## 2023-12-27 RX ADMIN — SODIUM CHLORIDE, SODIUM LACTATE, POTASSIUM CHLORIDE, AND CALCIUM CHLORIDE: .6; .31; .03; .02 INJECTION, SOLUTION INTRAVENOUS at 13:30

## 2023-12-27 SDOH — HEALTH STABILITY: MENTAL HEALTH: CURRENT SMOKER: 0

## 2023-12-27 ASSESSMENT — PAIN - FUNCTIONAL ASSESSMENT
PAIN_FUNCTIONAL_ASSESSMENT: 0-10

## 2023-12-27 ASSESSMENT — PAIN DESCRIPTION - DESCRIPTORS: DESCRIPTORS: ACHING

## 2023-12-27 ASSESSMENT — PAIN SCALES - GENERAL
PAINLEVEL_OUTOF10: 0 - NO PAIN
PAINLEVEL_OUTOF10: 7

## 2023-12-27 ASSESSMENT — COLUMBIA-SUICIDE SEVERITY RATING SCALE - C-SSRS
2. HAVE YOU ACTUALLY HAD ANY THOUGHTS OF KILLING YOURSELF?: NO
1. IN THE PAST MONTH, HAVE YOU WISHED YOU WERE DEAD OR WISHED YOU COULD GO TO SLEEP AND NOT WAKE UP?: NO
6. HAVE YOU EVER DONE ANYTHING, STARTED TO DO ANYTHING, OR PREPARED TO DO ANYTHING TO END YOUR LIFE?: NO

## 2023-12-27 NOTE — ANESTHESIA POSTPROCEDURE EVALUATION
Patient: Nereyda Arauz    Procedure Summary       Date: 12/27/23 Room / Location: [facility] OR 02 / [facility]    Anesthesia Start: 1331 Anesthesia Stop: 1422    Procedure: Dupuytren's Facsiectomy of Palm, Right Ring Ray (Right: Hand) Diagnosis:       Contracture of palmar fascia      (Contracture of palmar fascia [M72.0])    Surgeons: Tejas Haq MD Responsible Provider: Yoav Chahal MD    Anesthesia Type: MAC ASA Status: 2            Anesthesia Type: MAC    Vitals Value Taken Time   /78 12/27/23 1419   Temp 36.3 °C (97.3 °F) 12/27/23 1419   Pulse 70 12/27/23 1419   Resp 18 12/27/23 1419   SpO2 96 % 12/27/23 1419       Anesthesia Post Evaluation    Patient location during evaluation: bedside  Patient participation: complete - patient participated  Level of consciousness: awake and awake and alert  Pain management: adequate  Airway patency: patent  Cardiovascular status: acceptable  Respiratory status: acceptable  Hydration status: acceptable  Postoperative Nausea and Vomiting: none    No notable events documented.

## 2023-12-27 NOTE — DISCHARGE INSTRUCTIONS
Follow-Up Instructions  You will need to be seen in clinic by Dr. Haq in 10-15 days for a post-operative evaluation.    You will need to call and schedule an appointment, unless there is a previous appointment that appears on your discharge instructions.  The direct orthopaedic clinic appointment line phone number is [phone].  Please do not delay in calling to make this appointment.    You should also follow up with your primary care provider in 1-2 weeks.    Activity Restrictions  1) No driving until further instructed by your orthopaedic physician, which will be addressed at your outpatient appointments.    2) No driving or operating heavy machinery while taking narcotic pain medication.    3) Weight bearing status --> as tolerated RUE.     Discharge Medications  You have been sent home with the following home medications: Norco.  Please wean yourself off the Norco, as tolerated.     You should also take tylenol 650mg every 6 hours as needed to reduce the amount of oxycodone you need for pain.    Wound care instructions:   1) Leave operative dressing in place until 7 days after surgery. Then remove and leave incision open to air. Let water run freely over incision when showering, do not scrub. Do not soak in pool or tub.    2) Call if any drainage after 7 days, increased redness/warmth/swelling at incision site, abnormal pain/tenderness of the extremity, abnormal swelling of the extremity that does not respond to elevation, SOB/chest pain.       May have Tylenol after: 7:00PM    May have celebrex per prescription.  OK to take per Dr. Haq

## 2023-12-27 NOTE — OP NOTE
ORTHOPEDIC OPERATIVE NOTE    Name:     Nereyda Arauz  :     1965  Facility:    [facility]  Date of Surgery:   2023     PREOP DX:     Dupuytren's contracture, right ring ray    POSTOP DX:       Same    PROCEDURE:     Open Dupuytren's fasciectomy of palm, right ring ray    SURGEON: JR Severino MD    RESIDENT/FELLOW/ASSISTANT:  Claus Mcmillan MD    ANESTHESIA:    Monitor Anesthesia Care    ESTIMATED BLOOD LOSS :   2 ml    TOTAL FLUIDS:     300 cc LR    SPECIMEN:   Dupuytren's cord    TOURNIQUET TIME: 13 minutes at 250 mmHg    COMPLICATIONS:  None    PATIENT RETURNED TO/CONDITION:  PACU in Good      INDICATIONS:      Nereyda Arauz is a 58 y.o. female who presents with a central cord in the right ring ray with associated skin pitting from Dupuytren's disease.  She has a 25 degree MP flexion contracture but full composite IP extension, and good reversal to full composite flexion.  She is here for elective Dupuytren's fasciectomy.  I reminded her of surgical risks of infection, scarring, damage to nerves, tendons, or vessels, stiffness, wound healing problems, failure to achieve correction, and need for further surgery.  I stressed that the goal of the surgery is to improve the position of the finger, and not to cure the underlying Dupuytren's disease.  The expectation is that the cord and contracture and eventually recur.  She voiced understanding and wished to proceed.      NARRATIVE:       Following identification of patient and confirmation of correct site of surgery and signed operative consent, she was brought to the operating room and a hand table affixed to the cart.  A light MAC sedation was administered by Anesthesia, along with IV antibiotic dose.  A pneumatic tourniquet was placed high on the right arm, and the limb was prepped from fingertip to cuff with chlorhexidine, and draped free in the usual sterile fashion.  8 cc of a mix of half percent Marcaine and 1% lidocaine plain  was instilled to the planned incision area.  The limb was exsanguinated with an Esmarch, and the tourniquet inflated.    A 3-limb zigzag incision was made over the palpable cord, centered on the palmar crease, and taken carefully down under high loupe magnification.  The cord was sharply dissected away from the underside of the skin and around to its midline.  The transverse fibers at the palmar crease were identified.  These were carefully elevated and divided with scissors as the underlying neurovascular bundles were protected.  The cord was then divided proximally and distally, restoring full composite extension to the ring ray.  A small involved section of the septa was released, and the cord was sent to Pathology as specimen.  The tourniquet was deflated, and pink color rapidly returned to all digits and skin flaps.  Meticulous hemostasis was achieved with bipolar.  After final irrigation, skin was closed with interrupted 4-0 Monocryl.  Xeroform and bulky soft dressing was applied.  Patient was awakened and transferred to Recovery in stable condition.          Electronically signed  VASHTI Haq MD  [phone]

## 2023-12-27 NOTE — BRIEF OP NOTE
Date: 2023  OR Location: [facility]    Name: Nereyda Arauz, : 1965, Age: 58 y.o., MRN: [id number], Sex: female    Diagnosis  Pre-op Diagnosis     * Contracture of palmar fascia [M72.0] Post-op Diagnosis     * Contracture of palmar fascia [M72.0]     Procedures  Dupuytren's Facsiectomy of Palm, Right Ring Ray  02264 - PA FASCT PALM W/WO Z-PLASTY TISSUE REARGMT/SKN GRFT      Surgeons      * Tejas Haq - Primary    Resident/Fellow/Other Assistant:  Surgeon(s) and Role:     * Tu Mcmillan MD - Resident - Assisting    Procedure Summary  Anesthesia: Monitor Anesthesia Care  ASA: II  Anesthesia Staff: Anesthesiologist: Yoav Chahal MD  CRNA: KENNEY Hernandez-CRNA  Estimated Blood Loss: 2 mL  Intra-op Medications: * No intraprocedure medications in log *           Anesthesia Record               Intraprocedure I/O Totals          Intake    .00 mL    Propofol Drip 0.00 mL    The total shown is the total volume documented since Anesthesia Start was filed.    Total Intake 300 mL       Output    Est. Blood Loss 2 mL    Total Output 2 mL       Net    Net Volume 298 mL          Specimen:   ID Type Source Tests Collected by Time   1 : Dupytren's Cord Tissue DUPUYTREN'S CONTRACTURE SURGICAL PATHOLOGY EXAM Tejas Haq MD 2023 1515        Staff:   Circulator: She Yao RN; Malgorzata Quach RN  Scrub Person: Annmarie Foster          Findings: pretendinous cord within the palm, full passive extension of ring MCPJ after excision     Complications:  None; patient tolerated the procedure well.     Disposition: PACU - hemodynamically stable.  Condition: stable  Specimens Collected:   ID Type Source Tests Collected by Time   1 : Dupytren's Cord Tissue DUPUYTREN'S CONTRACTURE SURGICAL PATHOLOGY EXAM Tejas Haq MD 2023 9828     Attending Attestation: I was present and scrubbed for the entire procedure.    Tejas Haq  Phone Number: [phone]

## 2023-12-27 NOTE — H&P
History Of Present Illness  Nereyda Arauz is a 58 y.o. female presenting with Dupuytren's contracture of the right ring ray.  This has mildly progressed since her last visit and is interfering with ADLs due to inability to fully extend the ring finger.  The knuckle pads remain painful, but there is no palmar comfort.  No numbness or tingling.  No other bothersome digits.  She is here for elective Dupuytren's fasciectomy     Past Medical History  Past Medical History:   Diagnosis Date    COVID-19 07/14/2022    COVID-19    COVID-19 07/14/2022    Positive self-administered antigen test for COVID-19    GERD (gastroesophageal reflux disease)     Headache, unspecified     Generalized headaches    Personal history of other diseases of the respiratory system 09/19/2022    History of sore throat    Personal history of urinary (tract) infections     H/O bladder infections       Surgical History  Past Surgical History:   Procedure Laterality Date    OTHER SURGICAL HISTORY  04/12/2021    Hysterectomy    OTHER SURGICAL HISTORY  04/12/2021    Gallbladder surgery    OTHER SURGICAL HISTORY  04/12/2021    Hand surgery    OTHER SURGICAL HISTORY  04/12/2021    Foot surgery    OTHER SURGICAL HISTORY  04/12/2021    Appendectomy    OTHER SURGICAL HISTORY  04/12/2021    Tubal ligation    OTHER SURGICAL HISTORY  04/12/2021    Appendectomy    OTHER SURGICAL HISTORY  04/12/2021    Hysterectomy    OTHER SURGICAL HISTORY  04/12/2021    Cholecystectomy    SHOULDER SURGERY  11/13/2013    Shoulder Surgery        Social History  She reports that she has never smoked. She has never used smokeless tobacco. She reports that she does not drink alcohol and does not use drugs.    Family History  Family History   Problem Relation Name Age of Onset    Breast cancer Mother      Other (Cardiac disorder) Mother      Diabetes Mother      Hypertension Mother      Other (Cardiac disorder) Father      Hypothyroidism Father      Other (Cardiac disorder) Other  "Grandparent     Hypothyroidism Other Grandparent         Allergies  Prochlorperazine, Prochlorperazine maleate, and Sucralfate    Review of systems    A 30-item multi-system Review Of Systems was obtained on today's intake form.  This was reviewed with the patient and is correct.  The pertinent positives and negatives are listed above.  The form has been scanned separately into the medical record.     Physical exam    Constitutional:    Appears stated age. Well-developed and well-nourished white female in no acute distress.  Psychiatric:         Pleasant normal mood and affect. Behavior is appropriate for the situation.   Head:                   Normocephalic and atraumatic.  Eyes:                    Pupils are equal and round.  Cardiovascular:  2+ radial and ulnar pulses. Fingers well-perfused.  Respiratory:        Effort normal. No respiratory distress. Speaking in complete sentences.  Neurologic:       Alert and oriented to person, place, and time.  Skin:                Skin is intact, warm and dry.  Hematologic / Lymphatic:    No lymphedema or lymphangitis.    Extremities / Musculoskeletal:                Skin of the right ring finger and hand is intact with no erythema, ecchymosis, or diffuse swelling.  There is a central cord in the right ring ray with associated skin pitting and a branch.  This is producing a 25 degree MP flexion contracture but allowing full composite IP extension.  Good reversal to full composite flexion.  No triggering.  Symmetric wrist and forearm motion.  Sensation intact to light touch in all distributions.  Capillary refill less than 2 seconds.     Last Recorded Vitals  Blood pressure 166/79, pulse 59, temperature 36.9 °C (98.4 °F), temperature source Temporal, resp. rate 18, height 1.626 m (5' 4\"), weight 90.4 kg (199 lb 4.7 oz), SpO2 97 %.    Assessment/Plan   Dupuytren's contracture right ring ray      To the operating room today for Dupuytren's fasciectomy.  I reviewed the major " risks and benefits with the patient.  She wished to proceed.       I spent 15 minutes in the professional and overall care of this patient.      Tejas Haq MD

## 2023-12-27 NOTE — ANESTHESIA PREPROCEDURE EVALUATION
Patient: Nereyda Arauz    Procedure Information       Date/Time: 12/27/23 1230    Procedure: Dupuytren's Facsiectomy of Palm, Right Ring Ray (Right: Hand)    [facility]    Surgeons: Tejas Haq MD            Relevant Problems   Cardiovascular   (+) Chest pain   (+) White coat syndrome with hypertension      Endocrine   (+) Obesity, morbid (CMS/HCC)      GI   (+) GERD (gastroesophageal reflux disease)   (+) Irritable bowel syndrome   (+) PUD (peptic ulcer disease)      /Renal   (+) Calculus of kidney      Neuro/Psych   (+) Carpal tunnel syndrome of right wrist      Musculoskeletal   (+) Carpal tunnel syndrome of right wrist   (+) Cervical spondylosis      Infectious Disease   (+) Candidiasis   (+) Herpes simplex   (+) Staph infection   (+) Yeast vaginitis      Other   (+) Adhesive capsulitis of right shoulder   (+) Ankylosing spondylitis (CMS/HCC)   (+) Arthritis       Clinical information reviewed: has had difficulty remaining asleep with MAC procedures in the past     Tobacco  Allergies  Meds   Med Hx  Surg Hx  OB Status  Fam Hx  Soc   Hx        NPO Detail:  NPO/Void Status  Date of Last Liquid: 12/26/23  Time of Last Liquid: 2000  Date of Last Solid: 12/26/23  Time of Last Solid: 2000  Time of Last Void: 1230         Physical Exam    Airway  Mallampati: III  TM distance: >3 FB  Neck ROM: full     Cardiovascular - normal exam  Rhythm: regular  Rate: normal     Dental    Pulmonary - normal exam  Breath sounds clear to auscultation     Abdominal            Anesthesia Plan    ASA 2     MAC     The patient is not a current smoker.    intravenous induction   Anesthetic plan and risks discussed with patient.    Plan discussed with CRNA.

## 2023-12-28 ASSESSMENT — PAIN SCALES - GENERAL: PAINLEVEL_OUTOF10: 0 - NO PAIN

## 2024-01-02 LAB
LABORATORY COMMENT REPORT: NORMAL
PATH REPORT.FINAL DX SPEC: NORMAL
PATH REPORT.GROSS SPEC: NORMAL
PATH REPORT.RELEVANT HX SPEC: NORMAL
PATH REPORT.TOTAL CANCER: NORMAL

## 2024-01-17 ENCOUNTER — OFFICE VISIT (OUTPATIENT)
Dept: ORTHOPEDIC SURGERY | Facility: CLINIC | Age: 59
End: 2024-01-17
Payer: MEDICARE

## 2024-01-17 DIAGNOSIS — M72.0 DUPUYTREN'S CONTRACTURE OF RIGHT HAND: Primary | ICD-10-CM

## 2024-01-17 PROCEDURE — 99024 POSTOP FOLLOW-UP VISIT: CPT | Performed by: ORTHOPAEDIC SURGERY

## 2024-01-17 PROCEDURE — 1036F TOBACCO NON-USER: CPT | Performed by: ORTHOPAEDIC SURGERY

## 2024-01-17 PROCEDURE — 3008F BODY MASS INDEX DOCD: CPT | Performed by: ORTHOPAEDIC SURGERY

## 2024-01-17 NOTE — LETTER
January 17, 2024     Tejas Steward DO  [address] [facility] [address]    Patient: Nereyda Arauz   YOB: 1965   Date of Visit: 1/17/2024       Dear Dr. Tejas Steward DO:    Thank you for referring Nereyda Arauz to me for evaluation. Below are my notes for this consultation.  If you have questions, please do not hesitate to call me. I look forward to following your patient along with you.       Sincerely,     Tejas Haq MD      CC: No Recipients  ______________________________________________________________________________________    CHIEF COMPLAINT      Dupuytren's postop check    ASSESSMENT + PLAN    Postop day 20 from right ring ray Dupuytren's fasciectomy    The incision is healing normally.  Sutures are absorbable.  You may get this wet, but should not soak it for one more week.  Advance activity as pain allows.  Work on the stretching exercises that I demonstrated.  I provided a formal occupational therapy referral so that they can make a static night extension splint.  We discussed the importance of this for reducing the risk of recurrent contracture over the next 4 to 6 months.  It should be worn just at night..     Follow up with any concerns.      HISTORY OF PRESENT ILLNESS       Patient returns today, postop day 20 from right ring Dupuytren's fasciectomy from the palm, for her first postoperative visit.  Pain has largely resolved.  No numbness or tingling.  She does have a tight feeling as though the finger is pulling down again.      PHYSICAL EXAM       Dressing removed today.  Incision clean, dry, intact.  Absorbable sutures are still in place.  Composite extension is full.  Long finger MP has a 5 degree flexion contracture.  That side was treated long ago.  Good reversal to full composite flexion.  Sensation intact to light touch in all distributions.  Capillary refill less than 2 seconds.  Symmetric wrist and forearm motion.  2+ radial and ulnar  pulses.      IMAGING / LABS / EMGs        None today      Electronically Signed      VASHTI Haq MD      Orthopaedic Hand Surgery      [phone]

## 2024-01-17 NOTE — PROGRESS NOTES
CHIEF COMPLAINT      Dupuytren's postop check    ASSESSMENT + PLAN    Postop day 20 from right ring ray Dupuytren's fasciectomy    The incision is healing normally.  Sutures are absorbable.  You may get this wet, but should not soak it for one more week.  Advance activity as pain allows.  Work on the stretching exercises that I demonstrated.  I provided a formal occupational therapy referral so that they can make a static night extension splint.  We discussed the importance of this for reducing the risk of recurrent contracture over the next 4 to 6 months.  It should be worn just at night..     Follow up with any concerns.      HISTORY OF PRESENT ILLNESS       Patient returns today, postop day 20 from right ring Dupuytren's fasciectomy from the palm, for her first postoperative visit.  Pain has largely resolved.  No numbness or tingling.  She does have a tight feeling as though the finger is pulling down again.      PHYSICAL EXAM       Dressing removed today.  Incision clean, dry, intact.  Absorbable sutures are still in place.  Composite extension is full.  Long finger MP has a 5 degree flexion contracture.  That side was treated long ago.  Good reversal to full composite flexion.  Sensation intact to light touch in all distributions.  Capillary refill less than 2 seconds.  Symmetric wrist and forearm motion.  2+ radial and ulnar pulses.      IMAGING / LABS / EMGs        None today      Electronically Signed      VASHTI Haq MD      Orthopaedic Hand Surgery      [phone]

## 2024-02-07 ENCOUNTER — EVALUATION (OUTPATIENT)
Dept: OCCUPATIONAL THERAPY | Facility: CLINIC | Age: 59
End: 2024-02-07
Payer: MEDICARE

## 2024-02-07 DIAGNOSIS — M72.0 DUPUYTREN'S CONTRACTURE OF RIGHT HAND: Primary | ICD-10-CM

## 2024-02-07 DIAGNOSIS — G89.18 POST-OP PAIN: ICD-10-CM

## 2024-02-07 PROCEDURE — 97165 OT EVAL LOW COMPLEX 30 MIN: CPT | Mod: GO

## 2024-02-07 NOTE — PROGRESS NOTES
OCCUPATIONAL THERAPY UPPER EXTREMITY-HAND EVALUATION    Visit #: 1  Referred to Occupational Therapy for evaluation and treatment.  Referring Provider:   Tejas Haq MD    Diagnosis:   1. Dupuytren's contracture of right hand  Referral to Occupational Therapy           DOI/Mechanism: @DOI@     Surgery:  12/27/2023 - Dupuytren's Release    Precautions:   Arthritis    Reviewed precautions indicated above with patient.    Payor: /Plan: /Product Type:  Payor: [employer] MEDICARE / Plan: [employer] MEDICARE / Product Type: *No Product type* /     Nereyda Arauz is a 58 y.o.  female.     Past Medical History please see patient chart.    Employment:  Disability for Right shoulder injury and plantar fascitis      Identification was verified by patient verbalizing name and date of birth.    SUBJECTIVE:    Patient's Goals: to get a splint to     Pain scale: Pain is not rated/10  Pain Located at Right palm and is described as aching  Pain addressed with Orthosis    OBJECTIVE:    Functional Deficits/ADL Status: Patient reports difficulty with increase pain during gripping due to bulky scar in plam..    Appearance:  Scar severe adherence    Palpation:  Tender to palpation at right volar palm    Range of motion:  Full hand opening/closing and full opposition  Intact per patient, ferdy formally assessed    Additional OT Assessments:    Functional Outcome Measure: Quick DASH Score (Disability of Arm, Shoulder, Hand)    Date:    2/7/2024     Disability%:  36.36         Treatment Today: See Home Exercise Program    Home Program:    Scar mobilization-scar conformer at night  Edema control size D tubigrip at night  Blocking and tendon glide AROM  Wear night orthosis to improve freedom of finger ext and decrease scar adherence    In 11a out 12 p  30 min  Eval x 1  HFO x 1    Assessment:  s/p Duputren's release with severe scar adherence.  She will benefit from night splinting and HEP which was provided today.  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  She agreed with POC.    Goals:  Patient will have decrease scar adherence in palm.  Patient will be able to place hand on table without scar hurting.  Patient will be able to resume previous ADL's.  Patient will be independent in HEP.    Plan:  RTC prn

## 2024-04-24 NOTE — PROGRESS NOTES
Subjective   Patient ID: Nereyda Arauz is a 59 y.o. female who presents for Pre-op Exam.    HPI    Patient presents in the office for a pre op evaluation.  patient is having a feet injection  On 5/6/24  At [facility]  with. Michele Giraldo DPM     Patient denies having any history of any allergic reactions to anesthesia.    Patient is currently under the care of a Cardiologist Dr Dmitriy Morin   She states her heart races.     Pt has a spot on her left shoulder that she want to get check out     She states she started taking Mounjaro for weight loss.  She is getting this from [employer].  She did have labs performed at [facility].   She states the Mounjaro makes her tired.  She states she knows she had a fatty liver from the gastro doctor.     She has a lump on her right shoulder.   Admits to pain.  Has been present for 1 month.     No other concern /question     Review of systems  ; Patient seen today for exam denies any problems with headaches or vision, denies any shortness of breath chest pain nausea or vomiting, no black stool no blood in the stool no heartburn type symptoms denies any problems with constipation or diarrhea, and no dysuria-type symptoms    The patient's allergies medications were reviewed with them today    The patient's social family and surgical history or also reviewed here today, along with her past medical history.     Objective     Alert and active in  no acute distress  HEENT TMs clear oropharynx negative nares clear no drainage noted neck supple  With no adenopathy   Heart regular rate and rhythm without murmur and no carotid bruits  Lungs- clear to auscultation bilaterally, no wheeze or rhonchi noted  Thyroid -negative masses or nodularity  Abdomen- soft times four quadrants , bowel sounds positive no masses or organomegaly, negative tenderness guarding or rebound  Neurological exam unremarkable- DTRs in upper and lower extremities within normal limits.   skin - small cystic papules  "underneath skin on right shoulder.  Tender to touch    Recheck blood pressure is better but it is always elevated in stressful situations in the last couple times she has been here    /86 (BP Location: Right arm, Patient Position: Sitting, BP Cuff Size: Adult)   Pulse 86   Temp 36.4 °C (97.6 °F) (Temporal)   Resp 16   Ht 1.626 m (5' 4\")   Wt 85.4 kg (188 lb 3.2 oz)   LMP  (LMP Unknown)   SpO2 96%   BMI 32.30 kg/m²     Allergies   Allergen Reactions    Prochlorperazine Unknown    Prochlorperazine Maleate Nausea/vomiting    Sucralfate Itching and Rash       Assessment/Plan   Problem List Items Addressed This Visit       Ankylosing spondylitis (Multi)    Arthritis    Dyslipidemia    GERD (gastroesophageal reflux disease)    Fatigue    White coat syndrome with hypertension    Obesity, morbid (Multi)    Pre-op exam    BMI 32.0-32.9,adult    Class 1 obesity due to excess calories without serious comorbidity with body mass index (BMI) of 32.0 to 32.9 in adult    Inflammatory neuropathy (Multi)     Other Visit Diagnoses       Plantar fascial fibromatosis    -  Primary    Preop testing        Dysuria        Bilateral plantar fasciitis        Vitamin D deficiency        Relevant Orders    Vitamin D 25-Hydroxy,Total (for eval of Vitamin D levels)    Primary hypertension        Relevant Medications    bisoproloL-hydrochlorothiazide (Ziac) 2.5-6.25 mg tablet    Cyst of skin        Relevant Medications    amoxicillin-pot clavulanate (Augmentin) 875-125 mg tablet          Discussed patient's BMI and to institute calorie reduction and increase exercise to decrease risk of diabetes and heart disease in the future.    Reviewed labs.    Recommend Vitamin B12 3418-9467 mcg sublingually.     Recommend getting shoes that are supportive.     Ziac prescribed today.     Augmentin prescribed today for cyst.     She will have her labs performed and we will review them when available.       She will be cleared for surgery her old " EKG looks fine at this time we will put her on a small dose of Ziac I think to help her blood pressure and palpitations at times when she is in stressful situations    We will await her blood work and then she will be cleared for surgery      This is an addendum to her note on 5/2/2024    She is cleared for surgery, I assume with this type of surgery will be no general anesthesia as her 2 liver tests are elevated ALT and AST,, and will need to follow-up with me after surgery to look into this further they have not been elevated in the past,, we would recommend some sort of combo, sedation such as colonoscopy and endoscopy protocol      If anything worsens or changes please call us at once, follow up in the office as planned.    Scribe Attestation  By signing my name below, I, Lisa Lopez MA, Scribe   attest that this documentation has been prepared under the direction and in the presence of Tejas Steward DO.

## 2024-04-25 ENCOUNTER — OFFICE VISIT (OUTPATIENT)
Dept: PRIMARY CARE | Facility: CLINIC | Age: 59
End: 2024-04-25
Payer: MEDICARE

## 2024-04-25 VITALS
TEMPERATURE: 97.6 F | SYSTOLIC BLOOD PRESSURE: 138 MMHG | OXYGEN SATURATION: 96 % | RESPIRATION RATE: 16 BRPM | DIASTOLIC BLOOD PRESSURE: 86 MMHG | BODY MASS INDEX: 32.13 KG/M2 | HEIGHT: 64 IN | HEART RATE: 86 BPM | WEIGHT: 188.2 LBS

## 2024-04-25 DIAGNOSIS — E78.5 DYSLIPIDEMIA: ICD-10-CM

## 2024-04-25 DIAGNOSIS — L72.9 CYST OF SKIN: ICD-10-CM

## 2024-04-25 DIAGNOSIS — M72.2 BILATERAL PLANTAR FASCIITIS: ICD-10-CM

## 2024-04-25 DIAGNOSIS — M72.2 PLANTAR FASCIAL FIBROMATOSIS: Primary | ICD-10-CM

## 2024-04-25 DIAGNOSIS — Z01.818 PRE-OP EXAM: ICD-10-CM

## 2024-04-25 DIAGNOSIS — I10 WHITE COAT SYNDROME WITH HYPERTENSION: ICD-10-CM

## 2024-04-25 DIAGNOSIS — R30.0 DYSURIA: ICD-10-CM

## 2024-04-25 DIAGNOSIS — M19.90 ARTHRITIS: ICD-10-CM

## 2024-04-25 DIAGNOSIS — E66.01 OBESITY, MORBID (MULTI): ICD-10-CM

## 2024-04-25 DIAGNOSIS — G61.9 INFLAMMATORY NEUROPATHY (MULTI): ICD-10-CM

## 2024-04-25 DIAGNOSIS — R53.83 FATIGUE, UNSPECIFIED TYPE: ICD-10-CM

## 2024-04-25 DIAGNOSIS — E55.9 VITAMIN D DEFICIENCY: ICD-10-CM

## 2024-04-25 DIAGNOSIS — K21.9 GASTROESOPHAGEAL REFLUX DISEASE, UNSPECIFIED WHETHER ESOPHAGITIS PRESENT: ICD-10-CM

## 2024-04-25 DIAGNOSIS — Z01.818 PREOP TESTING: ICD-10-CM

## 2024-04-25 DIAGNOSIS — M45.9 ANKYLOSING SPONDYLITIS, UNSPECIFIED SITE OF SPINE (MULTI): ICD-10-CM

## 2024-04-25 DIAGNOSIS — E66.09 CLASS 1 OBESITY DUE TO EXCESS CALORIES WITHOUT SERIOUS COMORBIDITY WITH BODY MASS INDEX (BMI) OF 32.0 TO 32.9 IN ADULT: ICD-10-CM

## 2024-04-25 DIAGNOSIS — I10 PRIMARY HYPERTENSION: ICD-10-CM

## 2024-04-25 PROBLEM — E66.811 CLASS 1 OBESITY DUE TO EXCESS CALORIES WITHOUT SERIOUS COMORBIDITY WITH BODY MASS INDEX (BMI) OF 32.0 TO 32.9 IN ADULT: Status: ACTIVE | Noted: 2024-04-25

## 2024-04-25 PROCEDURE — 3008F BODY MASS INDEX DOCD: CPT | Performed by: FAMILY MEDICINE

## 2024-04-25 PROCEDURE — 3075F SYST BP GE 130 - 139MM HG: CPT | Performed by: FAMILY MEDICINE

## 2024-04-25 PROCEDURE — 99214 OFFICE O/P EST MOD 30 MIN: CPT | Performed by: FAMILY MEDICINE

## 2024-04-25 PROCEDURE — 3079F DIAST BP 80-89 MM HG: CPT | Performed by: FAMILY MEDICINE

## 2024-04-25 PROCEDURE — 1036F TOBACCO NON-USER: CPT | Performed by: FAMILY MEDICINE

## 2024-04-25 RX ORDER — AMOXICILLIN AND CLAVULANATE POTASSIUM 875; 125 MG/1; MG/1
875 TABLET, FILM COATED ORAL 2 TIMES DAILY
Qty: 20 TABLET | Refills: 0 | Status: SHIPPED | OUTPATIENT
Start: 2024-04-25 | End: 2024-05-06 | Stop reason: HOSPADM

## 2024-04-25 RX ORDER — BISOPROLOL FUMARATE AND HYDROCHLOROTHIAZIDE 2.5; 6.25 MG/1; MG/1
1 TABLET ORAL DAILY
Qty: 30 TABLET | Refills: 2 | Status: SHIPPED | OUTPATIENT
Start: 2024-04-25 | End: 2024-05-30 | Stop reason: SDUPTHER

## 2024-04-25 RX ORDER — SULFASALAZINE 500 MG/1
500 TABLET ORAL DAILY
COMMUNITY
End: 2024-04-25 | Stop reason: WASHOUT

## 2024-04-25 ASSESSMENT — PATIENT HEALTH QUESTIONNAIRE - PHQ9
1. LITTLE INTEREST OR PLEASURE IN DOING THINGS: NOT AT ALL
2. FEELING DOWN, DEPRESSED OR HOPELESS: NOT AT ALL
SUM OF ALL RESPONSES TO PHQ9 QUESTIONS 1 AND 2: 0

## 2024-05-01 ENCOUNTER — ANESTHESIA EVENT (OUTPATIENT)
Dept: OPERATING ROOM | Facility: HOSPITAL | Age: 59
End: 2024-05-01
Payer: MEDICARE

## 2024-05-02 ENCOUNTER — TELEPHONE (OUTPATIENT)
Dept: PRIMARY CARE | Facility: CLINIC | Age: 59
End: 2024-05-02
Payer: MEDICARE

## 2024-05-02 NOTE — TELEPHONE ENCOUNTER
REVIEW BLOODWORK FOR MED CLEARANCE     Patient called Destiny earlier today. She asked about reviewing her blood work that was faxed over today. She did say this blood work does need to be reviewed for medical clearance for Dr. Giraldo done on 5/6/2024.     Results are scanned into her chart.

## 2024-05-02 NOTE — TELEPHONE ENCOUNTER
Called patient and made her aware. She already has an appointment scheduled with you on 5/30/24  She needs it faxed to Dr Giraldo. She's not at home right now. She will call back tomorrow morning with the fax # so we can fax it to their office

## 2024-05-03 NOTE — PREPROCEDURE INSTRUCTIONS
Current Medications   Medication Instructions    amoxicillin-pot clavulanate (Augmentin) 875-125 mg tablet Continue until night before surgery    Bifidobacterium infantis (Align) 4 mg capsule Take morning of surgery with sip of water, no other fluids    bisoproloL-hydrochlorothiazide (Ziac) 2.5-6.25 mg tablet Take morning of surgery with sip of water, no other fluids    ergocalciferol (Vitamin D-2) 1.25 MG (30442 UT) capsule Continue until night before surgery    esomeprazole (NexIUM) 40 mg DR capsule Take morning of surgery with sip of water, no other fluids    estradiol (Vagifem) 10 mcg tablet vaginal tablet Continue until night before surgery    TIRZEPATIDE, WEIGHT LOSS, SUBQ Took Wednesday 4/30          NPO Instructions: Nothing to eat after midnight, may have 13 ounces of clear liquids two hours prior to arrival, may take medications as discussed with sip of water.  Additional Instructions: Enter through main entrance of main hospital building, located at [address] [facility]. Proceed to registration, located in the back right hand corner. You will need your ID and insurance card for registration. Please ensure you have a responsible adult to drive you home.     Shower the morning of or night before your procedure. After you shower avoid lotions, powders, deodorants or anything applied to the skin. If you wear contacts or glasses, wear the glasses. If you do not have glasses, please bring a case for your contacts. You may wear hearing aids and dentures, bring a case for them or we will provide one. Make sure you wear something loose and comfortable. Keep in mind your surgery type and wear something that will accommodate incisions or bandages. Please remove all jewelry.     For further questions Alexi PAGE can be contacted at [phone] between 7AM-3PM.

## 2024-05-03 NOTE — TELEPHONE ENCOUNTER
Dr. Giraldo's office called. They state they do not have the patient labs. Labs refaxed over to [phone]. Confirmation was received as successful.

## 2024-05-05 NOTE — DISCHARGE INSTRUCTIONS
PODIATRIC SURGERY POST-OP INSTRUCTIONS    YOU HAD ANESTHESIA:  You must have a responsible adult drive you home and stay with you for the first 24 hours.    Do not drive a car or drink any alcohol for 24 hours after surgery.  Do not do any strenuous work or activity for the next 24 hours.  You may have mild nausea, a sore throat or raspy voice for a few days.    You received a local numbing block to the foot after your surgery. You should expect the surgical extremity to remain numb for the next 6-12 hours.    POST-OP INSTRUCTIONS:  Keep dressing clean, dry and intact until your first post-operative appointment.  Do not remove surgical dressing. You may need to loosen if necessary.   Do not shower unless using a cast protector to protect dressing.  If dressing gets wet, please call office immediately as this can lead to increased risk of infection or wound dehiscence.   Elevate surgical extremity as much as possible to help with pain and swelling.  Place ice pack behind knee of surgical extremity (20 minutes on/20 minutes off while awake). After 24 hours use ice behind the knee as needed for comfort.  Weight Bearing Status: Remain non-weight bearing in regular shoe gear for the first few days. Then can transition to weight bearing as tolerated in regular shoe gear.  Please resume all home medications.   Post-Operative Medications: You were prescribed Percocet for pain; please take as directed on the label. You may take Tylenol for pain; please take as directed on bottle. For your pain medication, take as needed; wean off as soon as tolerated. In the event you have constipation, you may take over the counter stool softeners and laxatives as needed.  Please call office ([phone]) to schedule post-operative appointment if not already scheduled for 3 weeks after surgery.   Should any problems, questions, or concerns arise, please call the clinic office.    WHEN TO CALL YOUR DOCTOR:  Fever (>100.4°F or 38.0°C) or  chills.  Incision problems such as redness, warmth, swelling, or foul smelling drainage.  Severe nausea or persistent vomiting.  Pain and swelling in your legs, especially if it is only on one side and not the other.  Pain with urination, cloudy urine, or foul smelling urine.  Or if you have any other problems or questions.  CALL 911 or go to the ED if you have any shortness of breath, difficulty breathing, or chest pain.

## 2024-05-06 ENCOUNTER — ANESTHESIA (OUTPATIENT)
Dept: OPERATING ROOM | Facility: HOSPITAL | Age: 59
End: 2024-05-06
Payer: MEDICARE

## 2024-05-06 ENCOUNTER — HOSPITAL ENCOUNTER (OUTPATIENT)
Facility: HOSPITAL | Age: 59
Setting detail: OUTPATIENT SURGERY
Discharge: HOME | End: 2024-05-06
Attending: PODIATRIST | Admitting: PODIATRIST
Payer: MEDICARE

## 2024-05-06 VITALS
TEMPERATURE: 98.6 F | RESPIRATION RATE: 18 BRPM | WEIGHT: 198.41 LBS | DIASTOLIC BLOOD PRESSURE: 61 MMHG | HEART RATE: 61 BPM | BODY MASS INDEX: 33.87 KG/M2 | SYSTOLIC BLOOD PRESSURE: 114 MMHG | OXYGEN SATURATION: 96 % | HEIGHT: 64 IN

## 2024-05-06 DIAGNOSIS — Z98.890 S/P BILATERAL FOOT SURGERY: Primary | ICD-10-CM

## 2024-05-06 PROCEDURE — 2500000004 HC RX 250 GENERAL PHARMACY W/ HCPCS (ALT 636 FOR OP/ED): Performed by: NURSE ANESTHETIST, CERTIFIED REGISTERED

## 2024-05-06 PROCEDURE — 3600000002 HC OR TIME - INITIAL BASE CHARGE - PROCEDURE LEVEL TWO: Performed by: PODIATRIST

## 2024-05-06 PROCEDURE — 7100000009 HC PHASE TWO TIME - INITIAL BASE CHARGE: Performed by: PODIATRIST

## 2024-05-06 PROCEDURE — 7100000001 HC RECOVERY ROOM TIME - INITIAL BASE CHARGE: Performed by: PODIATRIST

## 2024-05-06 PROCEDURE — 3700000001 HC GENERAL ANESTHESIA TIME - INITIAL BASE CHARGE: Performed by: PODIATRIST

## 2024-05-06 PROCEDURE — 7100000002 HC RECOVERY ROOM TIME - EACH INCREMENTAL 1 MINUTE: Performed by: PODIATRIST

## 2024-05-06 PROCEDURE — 2500000004 HC RX 250 GENERAL PHARMACY W/ HCPCS (ALT 636 FOR OP/ED): Performed by: PODIATRIST

## 2024-05-06 PROCEDURE — 2500000004 HC RX 250 GENERAL PHARMACY W/ HCPCS (ALT 636 FOR OP/ED): Mod: JZ

## 2024-05-06 PROCEDURE — 96372 THER/PROPH/DIAG INJ SC/IM: CPT | Performed by: PODIATRIST

## 2024-05-06 PROCEDURE — 3600000007 HC OR TIME - EACH INCREMENTAL 1 MINUTE - PROCEDURE LEVEL TWO: Performed by: PODIATRIST

## 2024-05-06 PROCEDURE — 2500000005 HC RX 250 GENERAL PHARMACY W/O HCPCS: Performed by: PODIATRIST

## 2024-05-06 PROCEDURE — 7100000010 HC PHASE TWO TIME - EACH INCREMENTAL 1 MINUTE: Performed by: PODIATRIST

## 2024-05-06 PROCEDURE — 3700000002 HC GENERAL ANESTHESIA TIME - EACH INCREMENTAL 1 MINUTE: Performed by: PODIATRIST

## 2024-05-06 RX ORDER — CEFAZOLIN SODIUM 2 G/100ML
2 INJECTION, SOLUTION INTRAVENOUS ONCE
Status: COMPLETED | OUTPATIENT
Start: 2024-05-06 | End: 2024-05-06

## 2024-05-06 RX ORDER — MIDAZOLAM HYDROCHLORIDE 1 MG/ML
1 INJECTION, SOLUTION INTRAMUSCULAR; INTRAVENOUS ONCE AS NEEDED
Status: DISCONTINUED | OUTPATIENT
Start: 2024-05-06 | End: 2024-05-06 | Stop reason: HOSPADM

## 2024-05-06 RX ORDER — PROPOFOL 10 MG/ML
INJECTION, EMULSION INTRAVENOUS AS NEEDED
Status: DISCONTINUED | OUTPATIENT
Start: 2024-05-06 | End: 2024-05-06

## 2024-05-06 RX ORDER — HYDRALAZINE HYDROCHLORIDE 20 MG/ML
5 INJECTION INTRAMUSCULAR; INTRAVENOUS EVERY 30 MIN PRN
Status: DISCONTINUED | OUTPATIENT
Start: 2024-05-06 | End: 2024-05-06 | Stop reason: HOSPADM

## 2024-05-06 RX ORDER — SODIUM CHLORIDE, SODIUM LACTATE, POTASSIUM CHLORIDE, CALCIUM CHLORIDE 600; 310; 30; 20 MG/100ML; MG/100ML; MG/100ML; MG/100ML
100 INJECTION, SOLUTION INTRAVENOUS CONTINUOUS
Status: DISCONTINUED | OUTPATIENT
Start: 2024-05-06 | End: 2024-05-06 | Stop reason: HOSPADM

## 2024-05-06 RX ORDER — LABETALOL HYDROCHLORIDE 5 MG/ML
5 INJECTION, SOLUTION INTRAVENOUS ONCE AS NEEDED
Status: DISCONTINUED | OUTPATIENT
Start: 2024-05-06 | End: 2024-05-06 | Stop reason: HOSPADM

## 2024-05-06 RX ORDER — BUPIVACAINE HYDROCHLORIDE 5 MG/ML
INJECTION, SOLUTION PERINEURAL AS NEEDED
Status: DISCONTINUED | OUTPATIENT
Start: 2024-05-06 | End: 2024-05-06 | Stop reason: HOSPADM

## 2024-05-06 RX ORDER — LIDOCAINE HYDROCHLORIDE 10 MG/ML
0.1 INJECTION INFILTRATION; PERINEURAL ONCE
Status: DISCONTINUED | OUTPATIENT
Start: 2024-05-06 | End: 2024-05-06 | Stop reason: HOSPADM

## 2024-05-06 RX ORDER — ACETAMINOPHEN 325 MG/1
650 TABLET ORAL EVERY 4 HOURS PRN
Status: DISCONTINUED | OUTPATIENT
Start: 2024-05-06 | End: 2024-05-06 | Stop reason: HOSPADM

## 2024-05-06 RX ORDER — OXYCODONE AND ACETAMINOPHEN 5; 325 MG/1; MG/1
1 TABLET ORAL EVERY 6 HOURS PRN
Qty: 28 TABLET | Refills: 0 | Status: SHIPPED | OUTPATIENT
Start: 2024-05-06 | End: 2024-05-13

## 2024-05-06 RX ORDER — ALBUTEROL SULFATE 0.83 MG/ML
2.5 SOLUTION RESPIRATORY (INHALATION) ONCE AS NEEDED
Status: DISCONTINUED | OUTPATIENT
Start: 2024-05-06 | End: 2024-05-06 | Stop reason: HOSPADM

## 2024-05-06 RX ORDER — ONDANSETRON HYDROCHLORIDE 2 MG/ML
4 INJECTION, SOLUTION INTRAVENOUS ONCE AS NEEDED
Status: DISCONTINUED | OUTPATIENT
Start: 2024-05-06 | End: 2024-05-06 | Stop reason: HOSPADM

## 2024-05-06 RX ORDER — DEXAMETHASONE SODIUM PHOSPHATE 10 MG/ML
4 INJECTION INTRAMUSCULAR; INTRAVENOUS ONCE
Status: DISCONTINUED | OUTPATIENT
Start: 2024-05-06 | End: 2024-05-06 | Stop reason: HOSPADM

## 2024-05-06 RX ORDER — MEPERIDINE HYDROCHLORIDE 25 MG/ML
12.5 INJECTION INTRAMUSCULAR; INTRAVENOUS; SUBCUTANEOUS EVERY 10 MIN PRN
Status: DISCONTINUED | OUTPATIENT
Start: 2024-05-06 | End: 2024-05-06 | Stop reason: HOSPADM

## 2024-05-06 RX ORDER — MIDAZOLAM HYDROCHLORIDE 1 MG/ML
INJECTION, SOLUTION INTRAMUSCULAR; INTRAVENOUS AS NEEDED
Status: DISCONTINUED | OUTPATIENT
Start: 2024-05-06 | End: 2024-05-06

## 2024-05-06 RX ADMIN — CEFAZOLIN SODIUM 2 G: 2 INJECTION, SOLUTION INTRAVENOUS at 10:11

## 2024-05-06 RX ADMIN — PROPOFOL 50 MG: 10 INJECTION, EMULSION INTRAVENOUS at 10:18

## 2024-05-06 RX ADMIN — PROPOFOL 50 MG: 10 INJECTION, EMULSION INTRAVENOUS at 10:22

## 2024-05-06 RX ADMIN — SODIUM CHLORIDE, SODIUM LACTATE, POTASSIUM CHLORIDE, AND CALCIUM CHLORIDE: .6; .31; .03; .02 INJECTION, SOLUTION INTRAVENOUS at 10:06

## 2024-05-06 RX ADMIN — PROPOFOL 50 MG: 10 INJECTION, EMULSION INTRAVENOUS at 10:14

## 2024-05-06 RX ADMIN — PROPOFOL 50 MG: 10 INJECTION, EMULSION INTRAVENOUS at 10:11

## 2024-05-06 RX ADMIN — MIDAZOLAM 2 MG: 1 INJECTION INTRAMUSCULAR; INTRAVENOUS at 10:11

## 2024-05-06 SDOH — HEALTH STABILITY: MENTAL HEALTH: CURRENT SMOKER: 0

## 2024-05-06 ASSESSMENT — ENCOUNTER SYMPTOMS
PSYCHIATRIC NEGATIVE: 1
RESPIRATORY NEGATIVE: 1
EYES NEGATIVE: 1
CONSTITUTIONAL NEGATIVE: 1
ENDOCRINE NEGATIVE: 1
GASTROINTESTINAL NEGATIVE: 1
NEUROLOGICAL NEGATIVE: 1

## 2024-05-06 ASSESSMENT — COLUMBIA-SUICIDE SEVERITY RATING SCALE - C-SSRS
2. HAVE YOU ACTUALLY HAD ANY THOUGHTS OF KILLING YOURSELF?: NO
6. HAVE YOU EVER DONE ANYTHING, STARTED TO DO ANYTHING, OR PREPARED TO DO ANYTHING TO END YOUR LIFE?: NO
1. IN THE PAST MONTH, HAVE YOU WISHED YOU WERE DEAD OR WISHED YOU COULD GO TO SLEEP AND NOT WAKE UP?: NO

## 2024-05-06 ASSESSMENT — PAIN - FUNCTIONAL ASSESSMENT
PAIN_FUNCTIONAL_ASSESSMENT: 0-10
PAIN_FUNCTIONAL_ASSESSMENT: 0-10

## 2024-05-06 ASSESSMENT — PAIN SCALES - GENERAL
PAIN_LEVEL: 0
PAINLEVEL_OUTOF10: 0 - NO PAIN

## 2024-05-06 NOTE — ANESTHESIA POSTPROCEDURE EVALUATION
Patient: Nereyda Arauz    Procedure Summary       Date: 05/06/24 Room / Location: [facility] OR 05 / Virtual [facility] OR    Anesthesia Start: 1006 Anesthesia Stop: 1029    Procedure: BILATERAL NERVE & BOTOX INJECTIONS (nerve injections x2 bilateral-total of 4) (Bilateral) Diagnosis:       (M72.2 Plantar fibromatosis, Bilateral)      (M45.9 Ankylosing spondylosis)      (M79.2 Neuritis, Lt. lower extremity)      (M79.671/M79l.672 Pain in Bilateral feet)    Surgeons: Michele Giraldo DPM Responsible Provider: Prakash Page MD    Anesthesia Type: general ASA Status: 2            Anesthesia Type: general    Vitals Value Taken Time   /61 05/06/24 1030   Temp 37 °C (98.6 °F) 05/06/24 1030   Pulse 66 05/06/24 1043   Resp 12 05/06/24 1030   SpO2 98 % 05/06/24 1043   Vitals shown include unfiled device data.    Anesthesia Post Evaluation    Patient location during evaluation: PACU  Patient participation: complete - patient participated  Level of consciousness: awake and alert  Pain score: 0  Pain management: adequate  Airway patency: patent  Cardiovascular status: acceptable  Respiratory status: acceptable  Hydration status: acceptable  Postoperative Nausea and Vomiting: none    No notable events documented.

## 2024-05-06 NOTE — OP NOTE
BILATERAL NERVE & BOTOX INJECTIONS (nerve injections x2 bilateral-total of 4) (B) Operative Note     Date: 2024  OR Location: [facility] OR    Name: Nereyda Arauz, : 1965, Age: 59 y.o., MRN: [id number], Sex: female    Diagnosis  Plantar fasciitis/ fibromatosis, BLE- M72.2  Neuritis, BLE - M79.2  Pain in feet, BLE-  M79.671; M79.672 Plantar fasciitis/ fibromatosis, BLE- M72.2  Neuritis, BLE - M79.2  Pain in feet, BLE-  M79.671; M79.672     Procedures  BILATERAL NERVE & BOTOX INJECTIONS (nerve injections x2 bilateral-total of 4)  14557 - CT INJECTION AA&/STRD OTHER PERIPHERAL NERVE/BRANCH      Surgeons      * Michele Giraldo - Primary    Resident/Fellow/Other Assistant:  Violet Avendaño, PGY-3   Crys Stoll, PGY-2    Procedure Summary  Anesthesia: General  ASA: II  Anesthesia Staff: Anesthesiologist: Prakash Page MD  CRNA: Rishabh Velazquez, KENNEY-CRNA, MIKE  Estimated Blood Loss: 0mL  Intra-op Medications: Administrations occurring from 1030 to 1125 on 24:  * No intraprocedure medications in log *           Anesthesia Record               Intraprocedure I/O Totals          Intake    LR bolus 200.00 mL    Total Intake 200 mL          Specimen: No specimens collected     Staff:   Circulator: Shruthi Skelton RN  Relief Circulator: Rekha Gaona RN  Scrub Person: Micki Iqbal; Irene James RN         Drains and/or Catheters: none    Tourniquet Times: none        Implants: none    Findings: consistent with pre op evaluation     Indications: Nereyda Arauz is an 59 y.o. female who is having surgery for M72.2 Plantar fibromatosis, Bilateral  M45.9 Ankylosing spondylosis  M79.2 Neuritis, Lt. lower extremity  M79.671/M79l.672 Pain in Bilateral feet.       Procedure Details:   This is a 59 year old female who was admitted to [facility] for bilateral nerve and plantar fascia pain.   Patient has been suffering with pain and difficulty walking.      Procedure:    Pre-operatively, patient was  examined. Areas of maximum point of tenderness were identified on bilateral plantar heel of the lower extremity. Five points along the left foot surgical scar was painful on palpation, plantarly.   Right heel along the medial and middle plantar fascia from point of origination thru the midpoint of attachment was very much tender and patient was grading. Patient asked for those points to be injection.    Noted positive tinel sign along the left dorsal intermediate cutaneous nerve and sural nerve level. Electrical like shooting towards digits when percussion along the both nerves.    Attention directed to the right foot. Patient reports pain, diffused allodynia and discomfort along the tibial nerve, RLE. Patient reports shooting pain to the digits.     Patient was brought to the OR and kept in bed. Patient was induced with MAC anestesia.     Using aseptic techniques:   1) 100 units of Botox, was diluted with 10mL of saline. 10mL of diluted Botox was injected, about 1 mL into each spot for a total of 10 marked spots to bilateral plantar heel.     2) Next, a total of 2cc of 1:1 mix of 4mg/ ml dexamethasone and 0.5% marcaine plain was injected into the sural nerve, LLE and into the dorsal intermediate cutaneous nerve.    3) 10mL of .5% marcaine plain was injection at the level of the tibial nerve, right lower extremity.     All sites were cleaned using alcohol again and was covered with cloth band aid.     Patient was transferred to PACU with vital signs and vascular status intact to bilateral lower extremity for further monitoring prior to discharge. The patient tolerated the procedure and anesthesia well in apparent satisfactory condition.    Complications:  None; patient tolerated the procedure well.    Disposition: PACU - hemodynamically stable.  Condition: stable       Attending Attestation:     Michele Giraldo  Phone Number: [phone]

## 2024-05-06 NOTE — H&P
History Of Present Illness  Nereyda Arauz is a 59 y.o. female presenting with BLE heel pain.     Past Medical History  Past Medical History:   Diagnosis Date    COVID-19 07/14/2022    COVID-19    COVID-19 07/14/2022    Positive self-administered antigen test for COVID-19    GERD (gastroesophageal reflux disease)     Headache, unspecified     Generalized headaches    Personal history of other diseases of the respiratory system 09/19/2022    History of sore throat    Personal history of urinary (tract) infections     H/O bladder infections       Surgical History  Past Surgical History:   Procedure Laterality Date    OTHER SURGICAL HISTORY  04/12/2021    Hysterectomy    OTHER SURGICAL HISTORY  04/12/2021    Gallbladder surgery    OTHER SURGICAL HISTORY  04/12/2021    Hand surgery    OTHER SURGICAL HISTORY  04/12/2021    Foot surgery    OTHER SURGICAL HISTORY  04/12/2021    Appendectomy    OTHER SURGICAL HISTORY  04/12/2021    Tubal ligation    OTHER SURGICAL HISTORY  04/12/2021    Appendectomy    OTHER SURGICAL HISTORY  04/12/2021    Hysterectomy    OTHER SURGICAL HISTORY  04/12/2021    Cholecystectomy    SHOULDER SURGERY  11/13/2013    Shoulder Surgery        Social History  She reports that she has never smoked. She has never used smokeless tobacco. She reports that she does not drink alcohol and does not use drugs.    Family History  Family History   Problem Relation Name Age of Onset    Breast cancer Mother      Other (Cardiac disorder) Mother      Diabetes Mother      Hypertension Mother      Other (Cardiac disorder) Father      Hypothyroidism Father      Other (Cardiac disorder) Other Grandparent     Hypothyroidism Other Grandparent         Allergies  Prochlorperazine, Prochlorperazine maleate, and Sucralfate    Review of Systems   Constitutional: Negative.    HENT: Negative.     Eyes: Negative.    Respiratory: Negative.     Gastrointestinal: Negative.    Endocrine: Negative.    Genitourinary: Negative.   Patient calls asking for CLS prep and instruction info.       Musculoskeletal:         BLE tenderness at the heel    Skin:         Well healed skin incision at the heel    Neurological: Negative.    Psychiatric/Behavioral: Negative.          Physical Exam  Alert and active in  no acute distress  HEENT TMs clear oropharynx negative nares clear no drainage noted neck supple  With no adenopathy   Heart regular rate and rhythm without murmur and no carotid bruits  Lungs- clear to auscultation bilaterally, no wheeze or rhonchi noted  Thyroid -negative masses or nodularity  Abdomen- soft times four quadrants , bowel sounds positive no masses or organomegaly, negative tenderness guarding or rebound  Neurological exam unremarkable- DTRs in upper and lower extremities within normal limits.   skin - well healed incision at the heel  Last Recorded Vitals  Weight 90.4 kg (199 lb 4.7 oz).    Relevant Results  Labs:  Lab Results   Component Value Date    SEDRATE 14 10/26/2020    CRP 0.55 10/26/2020    BLOODCULT  03/02/2023     No Growth at 1 days~No Growth at 2 days~No Growth at 3 days~NO GROWTH at 4 days - FINAL REPORT              Assessment/Plan   Active Problems:  There are no active Hospital Problems.      BLE heel Botox and possible nerve injection     Violet Avendaño MS, DPM  Podiatry Surgery, PGY-3       Note is not final- pending attending signature

## 2024-05-06 NOTE — ANESTHESIA PREPROCEDURE EVALUATION
Patient: Nereyda Arauz    Procedure Information       Date/Time: 05/06/24 1030    Procedure: BILATERAL NERVE & BOTOX INJECTIONS (nerve injections x2 bilateral-total of 4) (Bilateral) - Nerve injections X2, Bilateral (4 total)   28802 Botox injections, Bilateral    Location: [facility] / [facility]    Surgeons: Michele Giraldo DPM            Relevant Problems   Anesthesia (within normal limits)      Cardiac   (+) Chest pain   (+) White coat syndrome with hypertension      Neuro   (+) Carpal tunnel syndrome of right wrist   (+) Inflammatory neuropathy (Multi)      GI   (+) Dysphagia   (+) GERD (gastroesophageal reflux disease)   (+) Irritable bowel syndrome   (+) PUD (peptic ulcer disease)      /Renal   (+) Calculus of kidney      Endocrine   (+) Class 1 obesity due to excess calories without serious comorbidity with body mass index (BMI) of 32.0 to 32.9 in adult   (+) Obesity, morbid (Multi)      Musculoskeletal   (+) Carpal tunnel syndrome of right wrist   (+) Cervical spondylosis      ID   (+) Candidiasis   (+) Herpes simplex   (+) Staph infection   (+) Yeast vaginitis       Clinical information reviewed:    Allergies  Meds               NPO Detail:  NPO/Void Status  Date of Last Liquid: 05/05/24  Time of Last Liquid: 2100  Date of Last Solid: 05/05/24  Time of Last Solid: 2100         Physical Exam    Airway  Mallampati: II  TM distance: >3 FB  Neck ROM: full     Cardiovascular   Rhythm: regular  Rate: normal     Dental    Pulmonary    Abdominal        Anesthesia Plan    History of general anesthesia?: yes  History of complications of general anesthesia?: no    ASA 2     general     The patient is not a current smoker.  Patient was not previously instructed to abstain from smoking on day of procedure.  Patient did not smoke on day of procedure.    intravenous induction   Anesthetic plan and risks discussed with patient.  Use of blood products discussed with patient who.    Plan discussed with CRNA.

## 2024-05-30 ENCOUNTER — OFFICE VISIT (OUTPATIENT)
Dept: PRIMARY CARE | Facility: CLINIC | Age: 59
End: 2024-05-30
Payer: MEDICARE

## 2024-05-30 VITALS
SYSTOLIC BLOOD PRESSURE: 126 MMHG | RESPIRATION RATE: 18 BRPM | BODY MASS INDEX: 33.46 KG/M2 | HEIGHT: 64 IN | DIASTOLIC BLOOD PRESSURE: 80 MMHG | OXYGEN SATURATION: 97 % | WEIGHT: 196 LBS | HEART RATE: 76 BPM

## 2024-05-30 DIAGNOSIS — I10 PRIMARY HYPERTENSION: Primary | ICD-10-CM

## 2024-05-30 DIAGNOSIS — I10 WHITE COAT SYNDROME WITH HYPERTENSION: ICD-10-CM

## 2024-05-30 DIAGNOSIS — E78.5 DYSLIPIDEMIA: ICD-10-CM

## 2024-05-30 DIAGNOSIS — E66.09 CLASS 1 OBESITY DUE TO EXCESS CALORIES WITH SERIOUS COMORBIDITY AND BODY MASS INDEX (BMI) OF 33.0 TO 33.9 IN ADULT: ICD-10-CM

## 2024-05-30 PROCEDURE — 3008F BODY MASS INDEX DOCD: CPT | Performed by: FAMILY MEDICINE

## 2024-05-30 PROCEDURE — 3074F SYST BP LT 130 MM HG: CPT | Performed by: FAMILY MEDICINE

## 2024-05-30 PROCEDURE — 3078F DIAST BP <80 MM HG: CPT | Performed by: FAMILY MEDICINE

## 2024-05-30 PROCEDURE — 99213 OFFICE O/P EST LOW 20 MIN: CPT | Performed by: FAMILY MEDICINE

## 2024-05-30 RX ORDER — BISOPROLOL FUMARATE AND HYDROCHLOROTHIAZIDE 2.5; 6.25 MG/1; MG/1
1 TABLET ORAL DAILY
Qty: 90 TABLET | Refills: 1 | Status: SHIPPED | OUTPATIENT
Start: 2024-05-30 | End: 2025-05-30

## 2024-05-30 ASSESSMENT — PATIENT HEALTH QUESTIONNAIRE - PHQ9
2. FEELING DOWN, DEPRESSED OR HOPELESS: NOT AT ALL
SUM OF ALL RESPONSES TO PHQ9 QUESTIONS 1 AND 2: 0
1. LITTLE INTEREST OR PLEASURE IN DOING THINGS: NOT AT ALL

## 2024-05-30 NOTE — PROGRESS NOTES
"Subjective   Patient ID: Nereyda Arauz is a 59 y.o. female who presents for Hypertension.    HPI    Patient presents today for HTN follow up. She does eat a low sodium diet. She does sometimes exercise. She was prescribed Bisoprolol-hydrochlorothiazide at last office visit. She is not having side effects. Has not been checking BP at home.       Review of systems  ; Patient seen today for exam denies any problems with headaches or vision, denies any shortness of breath chest pain nausea or vomiting, no black stool no blood in the stool no heartburn type symptoms denies any problems with constipation or diarrhea, and no dysuria-type symptoms    The patient's allergies medications were reviewed with them today    The patient's social family and surgical history or also reviewed here today, along with her past medical history.     Objective     Alert and active in  no acute distress  HEENT TMs clear oropharynx negative nares clear no drainage noted neck supple  With no adenopathy   Heart regular rate and rhythm without murmur and no carotid bruits  Lungs- clear to auscultation bilaterally, no wheeze or rhonchi noted  Thyroid -negative masses or nodularity  Abdomen- soft times four quadrants , bowel sounds positive no masses or organomegaly, negative tenderness guarding or rebound  Neurological exam unremarkable- DTRs in upper and lower extremities within normal limits.   skin -no lesions noted      /80 (BP Location: Right arm, Patient Position: Sitting, BP Cuff Size: Large adult)   Pulse 76   Resp 18   Ht 1.626 m (5' 4\")   Wt 88.9 kg (196 lb)   LMP  (LMP Unknown)   SpO2 97%   BMI 33.64 kg/m²     Allergies   Allergen Reactions    Prochlorperazine Unknown    Prochlorperazine Maleate Nausea/vomiting    Sucralfate Itching and Rash       Assessment/Plan   Problem List Items Addressed This Visit       Dyslipidemia    White coat syndrome with hypertension     Other Visit Diagnoses       Primary hypertension    - "  Primary    Relevant Medications    bisoproloL-hydrochlorothiazide (Ziac) 2.5-6.25 mg tablet    BMI 33.0-33.9,adult        Class 1 obesity due to excess calories with serious comorbidity and body mass index (BMI) of 33.0 to 33.9 in adult              Discussed patient's BMI and to institute calorie reduction and increase exercise to decrease risk of diabetes and heart disease in the future.    Refill Bisoprolol-hydrochlorothiazide.     Recommend Oofos shoes or HOKAs.  Recommend she wear shoes while in the house as well.     If anything worsens or changes please call us at once, follow up in the office as planned.    Scribe Attestation  By signing my name below, I, Lisa Lopez MA, Scribe   attest that this documentation has been prepared under the direction and in the presence of Tejas Steward DO.

## 2024-07-22 DIAGNOSIS — N95.8 GENITOURINARY SYNDROME OF MENOPAUSE: Primary | ICD-10-CM

## 2024-07-23 RX ORDER — ESTRADIOL 10 UG/1
INSERT VAGINAL
Qty: 24 TABLET | Refills: 3 | Status: SHIPPED | OUTPATIENT
Start: 2024-07-23

## 2024-10-07 ENCOUNTER — APPOINTMENT (OUTPATIENT)
Dept: OBSTETRICS AND GYNECOLOGY | Facility: CLINIC | Age: 59
End: 2024-10-07
Payer: MEDICARE

## 2024-11-05 ENCOUNTER — APPOINTMENT (OUTPATIENT)
Dept: CARDIOLOGY | Facility: CLINIC | Age: 59
End: 2024-11-05
Payer: MEDICARE

## 2024-11-18 ENCOUNTER — APPOINTMENT (OUTPATIENT)
Dept: OBSTETRICS AND GYNECOLOGY | Facility: CLINIC | Age: 59
End: 2024-11-18
Payer: MEDICARE

## 2024-11-18 VITALS
DIASTOLIC BLOOD PRESSURE: 77 MMHG | SYSTOLIC BLOOD PRESSURE: 112 MMHG | HEART RATE: 61 BPM | HEIGHT: 64 IN | WEIGHT: 148.6 LBS | BODY MASS INDEX: 25.37 KG/M2 | OXYGEN SATURATION: 98 %

## 2024-11-18 DIAGNOSIS — Z12.31 VISIT FOR SCREENING MAMMOGRAM: ICD-10-CM

## 2024-11-18 DIAGNOSIS — N95.8 GENITOURINARY SYNDROME OF MENOPAUSE: ICD-10-CM

## 2024-11-18 DIAGNOSIS — Z01.419 ENCOUNTER FOR ANNUAL ROUTINE GYNECOLOGICAL EXAMINATION: Primary | ICD-10-CM

## 2024-11-18 PROCEDURE — 3074F SYST BP LT 130 MM HG: CPT | Performed by: OBSTETRICS & GYNECOLOGY

## 2024-11-18 PROCEDURE — 1036F TOBACCO NON-USER: CPT | Performed by: OBSTETRICS & GYNECOLOGY

## 2024-11-18 PROCEDURE — 3078F DIAST BP <80 MM HG: CPT | Performed by: OBSTETRICS & GYNECOLOGY

## 2024-11-18 PROCEDURE — 3008F BODY MASS INDEX DOCD: CPT | Performed by: OBSTETRICS & GYNECOLOGY

## 2024-11-18 PROCEDURE — 99396 PREV VISIT EST AGE 40-64: CPT | Performed by: OBSTETRICS & GYNECOLOGY

## 2024-11-18 RX ORDER — ESTRADIOL 10 UG/1
10 INSERT VAGINAL 2 TIMES WEEKLY
Qty: 40 TABLET | Refills: 3 | Status: SHIPPED | OUTPATIENT
Start: 2024-11-18 | End: 2025-11-18

## 2024-11-18 SDOH — ECONOMIC STABILITY: FOOD INSECURITY: WITHIN THE PAST 12 MONTHS, THE FOOD YOU BOUGHT JUST DIDN'T LAST AND YOU DIDN'T HAVE MONEY TO GET MORE.: NEVER TRUE

## 2024-11-18 SDOH — ECONOMIC STABILITY: TRANSPORTATION INSECURITY
IN THE PAST 12 MONTHS, HAS LACK OF TRANSPORTATION KEPT YOU FROM MEETINGS, WORK, OR FROM GETTING THINGS NEEDED FOR DAILY LIVING?: NO

## 2024-11-18 SDOH — ECONOMIC STABILITY: TRANSPORTATION INSECURITY
IN THE PAST 12 MONTHS, HAS THE LACK OF TRANSPORTATION KEPT YOU FROM MEDICAL APPOINTMENTS OR FROM GETTING MEDICATIONS?: NO

## 2024-11-18 SDOH — ECONOMIC STABILITY: FOOD INSECURITY: WITHIN THE PAST 12 MONTHS, YOU WORRIED THAT YOUR FOOD WOULD RUN OUT BEFORE YOU GOT MONEY TO BUY MORE.: NEVER TRUE

## 2024-11-18 ASSESSMENT — LIFESTYLE VARIABLES
HOW MANY STANDARD DRINKS CONTAINING ALCOHOL DO YOU HAVE ON A TYPICAL DAY: 1 OR 2
SKIP TO QUESTIONS 9-10: 1
HOW OFTEN DO YOU HAVE A DRINK CONTAINING ALCOHOL: 2-4 TIMES A MONTH
AUDIT-C TOTAL SCORE: 2
HOW OFTEN DO YOU HAVE SIX OR MORE DRINKS ON ONE OCCASION: NEVER

## 2024-11-18 ASSESSMENT — SOCIAL DETERMINANTS OF HEALTH (SDOH): HOW HARD IS IT FOR YOU TO PAY FOR THE VERY BASICS LIKE FOOD, HOUSING, MEDICAL CARE, AND HEATING?: NOT HARD AT ALL

## 2024-11-18 ASSESSMENT — PATIENT HEALTH QUESTIONNAIRE - PHQ9
1. LITTLE INTEREST OR PLEASURE IN DOING THINGS: NOT AT ALL
SUM OF ALL RESPONSES TO PHQ9 QUESTIONS 1 & 2: 0
2. FEELING DOWN, DEPRESSED OR HOPELESS: NOT AT ALL

## 2024-11-18 ASSESSMENT — PAIN SCALES - GENERAL: PAINLEVEL_OUTOF10: 0-NO PAIN

## 2024-11-18 NOTE — PROGRESS NOTES
"Annual Exam    Pap: ALDO/BSO  Nino: 2023 Cat 1  LMP: Hysterectomy  CC: issues with vagifem, issues with dissolving - discuss options. Bleeding with intercourse.     Dahiana Calle MA     GYN ANNUAL EXAM    SUBJECTIVE    Nereyda Arauz is a 59 y.o. female who presents for annual exam today.  She is s/p ALDO/BSO many years ago due to endometriosis.     She has been using vaginal estrogen to help with dryness and painful intercourse. She previously used estrogen cream but has recently been using the yuvafem tablet which she likes better because it is less messy.  She still has pain with intercourse, however.  She also occasionally has spotting after intercourse and believes it is due to small \"tears.\"     PMH - endometriosis, ankylosing spondylitisi     PSH - ALDO/BSO, appendectomy, cholecystectomy    OB history -   OB History    Para Term  AB Living   7 3 3   4 3   SAB IAB Ectopic Multiple Live Births   4       3      # Outcome Date GA Lbr Michael/2nd Weight Sex Type Anes PTL Lv   7 Term      Vag-Spont   DANIEL   6 Term      Vag-Spont   DANIEL   5 Term      Vag-Spont   DANIEL   4 SAB            3 SAB            2 SAB            1 SAB              Last pap - Paps no longer indicated     Last mammogram - 2023 - BIRADs 1     Family history of breast or ovarian cancer - mother- breast cancer, no FH of ovarian cancer     OBJECTIVE  /77 (BP Location: Right arm, Patient Position: Sitting)   Pulse 61   Ht 1.626 m (5' 4\")   Wt 67.4 kg (148 lb 9.6 oz)   LMP  (LMP Unknown)   SpO2 98%   BMI 25.51 kg/m²     General Appearance   - consistent with stated age, well groomed and cooperative    Integumentary  - skin warm and dry without rash    Head and Neck  - normalocephalic and neck supple    Chest and Lung Exam  - normal breathing effort, no respiratory distress    Breast  - symmetry noted, no mass palpable, no skin change and no nipple discharge.    Abdomen  - soft, nontender and no hepatomegaly, splenomegaly, " or mass    Female Genitourinary  - vulva normal without rash or lesion, normal vaginal rugae, no vaginal discharge, uterus normal size & no palpable masses, no adnexal mass, no adnexal tenderness, no cervical motion tenderness    Peripheral Vascular  - no edema present    ASSESSMENT/PLAN  59 y.o.  female who presents for annual exam.       Actions performed during this visit include:  - Clinical breast exam  - Clinical pelvic exam  - Pap- no longer indicated   - Mammogram- Breast cancer screening discussed and mammogram req given   - Colon cancer screening- needs to schedule - she is working on this   - Genitourinary syndrome of menopause - Prefers to stay on Yuvafem.  Offered repeat 14 day course followed by every 3 nights.  She will try this.  Also discussed referral to pelvic floor PT - declines this.     Please return for your next visit in 1 year or sooner as needed.     Delia Villalobos MD

## 2024-12-06 ENCOUNTER — OFFICE VISIT (OUTPATIENT)
Dept: PRIMARY CARE | Facility: CLINIC | Age: 59
End: 2024-12-06
Payer: MEDICARE

## 2024-12-06 ENCOUNTER — APPOINTMENT (OUTPATIENT)
Dept: RADIOLOGY | Facility: CLINIC | Age: 59
End: 2024-12-06
Payer: MEDICARE

## 2024-12-06 VITALS
HEIGHT: 64 IN | HEART RATE: 71 BPM | SYSTOLIC BLOOD PRESSURE: 110 MMHG | BODY MASS INDEX: 24.59 KG/M2 | DIASTOLIC BLOOD PRESSURE: 78 MMHG | OXYGEN SATURATION: 97 % | WEIGHT: 144 LBS | RESPIRATION RATE: 18 BRPM | TEMPERATURE: 97.8 F

## 2024-12-06 DIAGNOSIS — H65.193 ACUTE MUCOID OTITIS MEDIA OF BOTH EARS: Primary | ICD-10-CM

## 2024-12-06 DIAGNOSIS — K21.9 GASTROESOPHAGEAL REFLUX DISEASE WITHOUT ESOPHAGITIS: ICD-10-CM

## 2024-12-06 DIAGNOSIS — J06.9 UPPER RESPIRATORY TRACT INFECTION, UNSPECIFIED TYPE: ICD-10-CM

## 2024-12-06 DIAGNOSIS — I10 PRIMARY HYPERTENSION: ICD-10-CM

## 2024-12-06 DIAGNOSIS — E78.5 DYSLIPIDEMIA: ICD-10-CM

## 2024-12-06 PROCEDURE — 99213 OFFICE O/P EST LOW 20 MIN: CPT | Performed by: FAMILY MEDICINE

## 2024-12-06 PROCEDURE — 3008F BODY MASS INDEX DOCD: CPT | Performed by: FAMILY MEDICINE

## 2024-12-06 PROCEDURE — 3078F DIAST BP <80 MM HG: CPT | Performed by: FAMILY MEDICINE

## 2024-12-06 PROCEDURE — 3074F SYST BP LT 130 MM HG: CPT | Performed by: FAMILY MEDICINE

## 2024-12-06 PROCEDURE — 1036F TOBACCO NON-USER: CPT | Performed by: FAMILY MEDICINE

## 2024-12-06 PROCEDURE — G2211 COMPLEX E/M VISIT ADD ON: HCPCS | Performed by: FAMILY MEDICINE

## 2024-12-06 RX ORDER — BISOPROLOL FUMARATE AND HYDROCHLOROTHIAZIDE 2.5; 6.25 MG/1; MG/1
1 TABLET ORAL DAILY
Qty: 90 TABLET | Refills: 1 | Status: SHIPPED | OUTPATIENT
Start: 2024-12-06 | End: 2025-12-06

## 2024-12-06 RX ORDER — METHYLPREDNISOLONE 4 MG/1
TABLET ORAL
Qty: 21 TABLET | Refills: 0 | Status: SHIPPED | OUTPATIENT
Start: 2024-12-06 | End: 2024-12-12

## 2024-12-06 RX ORDER — CEFDINIR 300 MG/1
600 CAPSULE ORAL DAILY
Qty: 20 CAPSULE | Refills: 0 | Status: SHIPPED | OUTPATIENT
Start: 2024-12-06 | End: 2024-12-16

## 2024-12-06 ASSESSMENT — PATIENT HEALTH QUESTIONNAIRE - PHQ9
2. FEELING DOWN, DEPRESSED OR HOPELESS: NOT AT ALL
1. LITTLE INTEREST OR PLEASURE IN DOING THINGS: NOT AT ALL
SUM OF ALL RESPONSES TO PHQ9 QUESTIONS 1 AND 2: 0

## 2024-12-06 NOTE — PROGRESS NOTES
"Subjective   Patient ID: Nereyda Arauz is a 59 y.o. female who presents for Earache and Cough.  HPI  pt is being seen for possible ear infection in both ears   ongoing for 4 days   Pt reports her ears get clogged  other symptoms includes cough, fever, throat burns, congestion, sinus, fatigue  Pt did not tests for COVID  pt taking Tylenol, Vicks, Zyrtec, Allegra - no relief    Her grand-kid is sick, unsure if it is Covid or RSV    Went to ED 1 month ago for another ear infection, and was prescribed antibiotics - no relief     Pt also reports having chest pain when she coughs       No other concern /question     Review of systems  ; Patient seen today for exam denies any problems with headaches or vision, denies any shortness of breath chest pain nausea or vomiting, no black stool no blood in the stool no heartburn type symptoms denies any problems with constipation or diarrhea, and no dysuria-type symptoms    The patient's allergies medications were reviewed with them today    The patient's social family and surgical history or also reviewed here today, along with her past medical history.     Objective     Alert and active in  no acute distress  HEENT Erythema and swelling in both ears. TMs clear oropharynx negative nares clear no drainage noted neck supple  With no adenopathy   Heart regular rate and rhythm without murmur and no carotid bruits  Lungs- clear to auscultation bilaterally, no wheeze or rhonchi noted  Thyroid - Anterior lymph node swollen. negative masses or nodularity        /78 (BP Location: Left arm, Patient Position: Sitting, BP Cuff Size: Adult)   Pulse 71   Temp 36.6 °C (97.8 °F) (Temporal)   Resp 18   Ht 1.626 m (5' 4\")   Wt 65.3 kg (144 lb)   LMP  (LMP Unknown)   SpO2 97%   BMI 24.72 kg/m²     Allergies   Allergen Reactions    Prochlorperazine Unknown, GI intolerance, Nausea/vomiting and Other    Prochlorperazine Maleate Nausea/vomiting    Sucralfate Itching, Rash, GI Upset and " Other       Assessment/Plan   Problem List Items Addressed This Visit       Dyslipidemia    GERD (gastroesophageal reflux disease)    BMI 24.0-24.9, adult    Upper respiratory tract infection     Other Visit Diagnoses       Acute mucoid otitis media of both ears    -  Primary    Relevant Medications    methylPREDNISolone (Medrol Dospak) 4 mg tablets    cefdinir (Omnicef) 300 mg capsule    Primary hypertension        Relevant Medications    bisoproloL-hydrochlorothiazide (Ziac) 2.5-6.25 mg tablet          Prescribed Cefdinir 600 mg every day for 10 days   Prescribed Medrol Dosepak today. May Combine with Tylenol.  Recommended to use OTC Afrin for 2-3 days to help with ear de-congestion   If not improved by Monday, she will reach out and call us.     Refilled Ziac.   Continue meds at the prescribed dose.     If anything worsens or changes please call us at once, follow up in the office as planned,       Scribe Attestation  By signing my name below, IPrincess, Scribe   attest that this documentation has been prepared under the direction and in the presence of Tejas Steward DO.

## 2024-12-13 ENCOUNTER — TELEPHONE (OUTPATIENT)
Dept: PRIMARY CARE | Facility: CLINIC | Age: 59
End: 2024-12-13
Payer: MEDICARE

## 2024-12-13 DIAGNOSIS — B37.31 CANDIDAL VAGINITIS: Primary | ICD-10-CM

## 2024-12-13 RX ORDER — FLUCONAZOLE 150 MG/1
TABLET ORAL
Qty: 3 TABLET | Refills: 1 | Status: SHIPPED | OUTPATIENT
Start: 2024-12-13

## 2024-12-13 NOTE — TELEPHONE ENCOUNTER
Patient has yeast infection from the antibiotics she  is on , please call something in to [facility] ?

## 2024-12-13 NOTE — TELEPHONE ENCOUNTER
Augie Lopez MD  [id number] [id number] Clinical Support Staff6 minutes ago (11:16 AM)       Prescription was sent in

## 2024-12-31 ENCOUNTER — APPOINTMENT (OUTPATIENT)
Dept: CARDIOLOGY | Facility: CLINIC | Age: 59
End: 2024-12-31
Payer: MEDICARE

## 2024-12-31 VITALS
BODY MASS INDEX: 24.24 KG/M2 | SYSTOLIC BLOOD PRESSURE: 110 MMHG | HEIGHT: 64 IN | HEART RATE: 58 BPM | DIASTOLIC BLOOD PRESSURE: 80 MMHG | WEIGHT: 142 LBS

## 2024-12-31 DIAGNOSIS — I10 WHITE COAT SYNDROME WITH HYPERTENSION: ICD-10-CM

## 2024-12-31 DIAGNOSIS — I51.7 LEFT ATRIAL ENLARGEMENT: Primary | ICD-10-CM

## 2024-12-31 PROBLEM — E78.5 DYSLIPIDEMIA: Status: RESOLVED | Noted: 2023-08-17 | Resolved: 2024-12-31

## 2024-12-31 PROCEDURE — 99214 OFFICE O/P EST MOD 30 MIN: CPT | Performed by: INTERNAL MEDICINE

## 2024-12-31 PROCEDURE — 3074F SYST BP LT 130 MM HG: CPT | Performed by: INTERNAL MEDICINE

## 2024-12-31 PROCEDURE — 99214 OFFICE O/P EST MOD 30 MIN: CPT | Mod: 25 | Performed by: INTERNAL MEDICINE

## 2024-12-31 PROCEDURE — 3079F DIAST BP 80-89 MM HG: CPT | Performed by: INTERNAL MEDICINE

## 2024-12-31 PROCEDURE — 93005 ELECTROCARDIOGRAM TRACING: CPT | Performed by: INTERNAL MEDICINE

## 2024-12-31 PROCEDURE — 3008F BODY MASS INDEX DOCD: CPT | Performed by: INTERNAL MEDICINE

## 2024-12-31 PROCEDURE — 1036F TOBACCO NON-USER: CPT | Performed by: INTERNAL MEDICINE

## 2024-12-31 PROCEDURE — 93010 ELECTROCARDIOGRAM REPORT: CPT | Performed by: INTERNAL MEDICINE

## 2024-12-31 NOTE — PROGRESS NOTES
"Chief Complaint:   No chief complaint on file.     History Of Present Illness:    Nereyda Arauz is a 59 y.o. female with family history of CAD, and a personal history of hypertension and mild left atrial enlargement by CT scan here for follow-up.    Overall is doing well.  Has lost about 50 pounds.  Also get started on Ziac by her primary care physician for elevated blood pressures.  Since that time her blood pressures have been well-controlled.    Does have arthritis and was prescribed Celebrex however prefers not to take it if at all possible.     CT calcium score 12/28/2022: Total score 0.  Aortic size 2.8 cm.    Treadmill nuclear stress test 12/19/2022: No evidence of ischemia or scar.  EF greater than 65%.     Allergies:  Prochlorperazine, Prochlorperazine maleate, and Sucralfate    Outpatient Medications:  Current Outpatient Medications   Medication Instructions    Align 4 mg, Daily    bisoproloL-hydrochlorothiazide (Ziac) 2.5-6.25 mg tablet 1 tablet, oral, Daily    esomeprazole (NEXIUM) 40 mg, Daily before breakfast    estradiol (VAGIFEM) 10 mcg, vaginal, 2 times weekly, Insert 1 tablet nightly x 2 weeks. Then insert 1 tablet two nights per week.    fluconazole (Diflucan) 150 mg tablet Take 1 tablet every 3 days    nystatin (Mycostatin) 100,000 unit/gram powder Apply to affected skin nightly as needed       Last Recorded Vitals:  Visit Vitals  /80 (BP Location: Left arm, Patient Position: Sitting)   Pulse 58   Ht 1.626 m (5' 4\")   Wt 64.4 kg (142 lb)   LMP  (LMP Unknown)   BMI 24.37 kg/m²   OB Status Hysterectomy   Smoking Status Never   BSA 1.71 m²      LASTWT(3):   Wt Readings from Last 3 Encounters:   12/31/24 64.4 kg (142 lb)   12/06/24 65.3 kg (144 lb)   11/18/24 67.4 kg (148 lb 9.6 oz)       Physical Exam:  In general: alert and in no acute distress.   HEENT: Carotid upstrokes normal with no bruits. JVP is normal.   Pulmonary: Clear to auscultation bilaterally.  Cardiovascular: S1, S2, regular. " "No appreciable murmurs, rubs or gallops.   Lower extremities: Warm. 2+ distal pulses. No edema.       Last Labs:  CBC -  Recent Labs     11/03/23 1122 08/17/23 1132 03/02/23 2000   WBC 8.3 7.9 9.1   HGB 13.6 13.4 14.7   HCT 44.4 43.3 47.0*    302 295   MCV 83 84 81       CMP -  Recent Labs     11/03/23 1122 08/17/23 1132 03/02/23 2000    140 139   K 3.7 3.8 3.7    103 104   CO2 28 28 21   ANIONGAP 14 13 18   BUN 13 12 14   CREATININE 0.70 0.77 0.77   EGFR >90  --   --      Recent Labs     11/03/23 1122 08/17/23 1132 03/02/23 2000   ALBUMIN 4.4 4.2 4.6   ALKPHOS 97 98 85   ALT 42 32 46*   AST 27 26 28   BILITOT 0.5 0.4 0.7       LIPID PANEL -   Recent Labs     08/17/23 1132 09/19/22  1152   CHOL 190 207*   LDLF 109* 124*   HDL 57.0 58.0   TRIG 121 124       No results for input(s): \"BNP\", \"HGBA1C\" in the last 33500 hours.        Assessment/Plan   1) left atrial enlargement by CT scan: No further work-up needed.  I explained that there is no signs or symptoms for her to watch specifically.  I do not believe that additional testing such as an echocardiogram is indicated at this time.     2) family history of CAD: Coronary calcium score of 0.  No signs or symptoms to suggest underlying coronary disease.  Continue with lifestyle and risk factor modification.     3) hypertension: Blood pressure controlled on Ziac 2.5/6.25 mg once daily.  Continue with this dose.     4) follow-up: 1-2 years or sooner if needed.        Dmitriy Morin MD  "

## 2025-01-10 ENCOUNTER — HOSPITAL ENCOUNTER (OUTPATIENT)
Dept: RADIOLOGY | Facility: CLINIC | Age: 60
Discharge: HOME | End: 2025-01-10
Payer: MEDICARE

## 2025-01-10 DIAGNOSIS — Z12.31 VISIT FOR SCREENING MAMMOGRAM: ICD-10-CM

## 2025-01-10 PROCEDURE — 77067 SCR MAMMO BI INCL CAD: CPT

## 2025-06-18 DIAGNOSIS — I10 PRIMARY HYPERTENSION: ICD-10-CM

## 2025-06-18 RX ORDER — BISOPROLOL FUMARATE AND HYDROCHLOROTHIAZIDE 2.5; 6.25 MG/1; MG/1
1 TABLET ORAL DAILY
Qty: 30 TABLET | Refills: 0 | Status: SHIPPED | OUTPATIENT
Start: 2025-06-18 | End: 2026-06-18

## 2025-06-18 NOTE — TELEPHONE ENCOUNTER
Patient's daughter, Ron saw you today and at check out, Nereyda made an appointment for both her and herself for a follow up with you for 25. She states that you told her to request a short supply of her medication until she sees you on 25    Rx Refill Request Telephone Encounter    Name:  Nereyda Arauz  : 1965     Medication Name:  bisoprolol-hydrochlorothiazide  Dose (Optional):    2.5-6.25 mg  Quantity (Optional):      Directions (Optional):   Take 1 tablet by mouth once daily     ALLERGIES:   see list     Specific Pharmacy location:  [facility]     Date of last appointment:  24  Date of next appointment:  25    Best number to reach patient:  [phone]

## 2025-07-15 NOTE — PROGRESS NOTES
Subjective   Patient ID: Nereyda Arauz is a 60 y.o. female who presents for Medicare Annual Wellness Visit Subsequent.  HPI    Patient presents today for AWV and HTN follow up. Does eat a low sodium diet. Does not exercise. Does check BP at home. Last eye exam was 3 years ago. Last dental exam was 6 months ago. Is fasting for BW today.    Patient reports having issues with intermittent dysphagia. She admits to foods getting stuck in her esophagus. Currently on Nexium 40 mg which she tries to not take regularly.     Patient reports sleeping disorder and requests a referral to sleep medicine. She has difficulty sleeping throughout the night. She wakes up frequently in the middle of the night and cannot go back to sleep. Her average sleep time is 4 hours. Patient states that she ends up feeling tired the next day. Her sleep is also disturbed by constant thinking of her household chores/routine the next morning. She tried Benadryl to help with sleep but experiences hangover from it.      Review of systems  ; Patient seen today for exam denies any problems with headaches or vision, denies any shortness of breath chest pain nausea or vomiting, no black stool no blood in the stool no heartburn type symptoms denies any problems with constipation or diarrhea, and no dysuria-type symptoms    The patient's allergies medications were reviewed with them today    The patient's social family and surgical history or also reviewed here today, along with her past medical history.     Objective     Alert and active in  no acute distress  HEENT TMs clear oropharynx negative nares clear no drainage noted neck supple  With no adenopathy   Heart regular rate and rhythm without murmur and no carotid bruits  Lungs- clear to auscultation bilaterally, no wheeze or rhonchi noted  Thyroid -negative masses or nodularity  Abdomen- soft times four quadrants , bowel sounds positive no masses or organomegaly, negative tenderness guarding or  "rebound    skin -no lesions noted      /80 (BP Location: Left arm, Patient Position: Sitting, BP Cuff Size: Adult long)   Pulse 57   Temp 36.6 °C (97.8 °F) (Temporal)   Ht 1.6 m (5' 3\")   Wt 65.8 kg (145 lb)   LMP  (LMP Unknown)   BMI 25.69 kg/m²         Assessment/Plan   Problem List Items Addressed This Visit       Ankylosing spondylitis    White coat syndrome with hypertension     Other Visit Diagnoses         Medicare annual wellness visit, subsequent    -  Primary      Primary hypertension        Relevant Medications    bisoproloL-hydrochlorothiazide (Ziac) 2.5-6.25 mg tablet      Dyslipidemia          Routine general medical examination at a health care facility          BMI 25.0-25.9,adult          Primary insomnia        Relevant Medications    traZODone (Desyrel) 50 mg tablet          Medicare wellness questionnaire reviewed in detail. Advanced Directives reviewed today, Importance of having Advance care planing discussed. Patient advised to provide the office with a copy if has not already done so. No problems with activities of daily living. Falls risks reviewed.     Dysphagia:  Advised on being consistent with Nexium 40 mg every other day.  Recommended less caffeine at night.    Insomnia:  Prescribed Trazodone 50 mg today; to be taken 2 tablets as needed at bedtime.  Recommended less caffeine at night.    Recommended use of sunscreen for sun protection.    Refilled bisoprolol-hydrochlorothiazide.    If anything worsens or changes please call us at once, follow up in the office as planned,       Scribe Attestation  By signing my name below, IPrincess Scribe   attest that this documentation has been prepared under the direction and in the presence of Tejas Steward DO.    All medical record entries made by the Scribe were at my direction and personally dictated by me.   I have reviewed the chart and agree that the record accurately reflects my personal performance of the history, " physical exam, discussion, and plan.

## 2025-07-16 ENCOUNTER — APPOINTMENT (OUTPATIENT)
Dept: PRIMARY CARE | Facility: CLINIC | Age: 60
End: 2025-07-16
Payer: MEDICARE

## 2025-07-16 VITALS
HEIGHT: 63 IN | WEIGHT: 145 LBS | HEART RATE: 57 BPM | TEMPERATURE: 97.8 F | DIASTOLIC BLOOD PRESSURE: 80 MMHG | SYSTOLIC BLOOD PRESSURE: 110 MMHG | BODY MASS INDEX: 25.69 KG/M2

## 2025-07-16 DIAGNOSIS — Z00.00 MEDICARE ANNUAL WELLNESS VISIT, SUBSEQUENT: Primary | ICD-10-CM

## 2025-07-16 DIAGNOSIS — F51.01 PRIMARY INSOMNIA: ICD-10-CM

## 2025-07-16 DIAGNOSIS — Z00.00 ROUTINE GENERAL MEDICAL EXAMINATION AT A HEALTH CARE FACILITY: ICD-10-CM

## 2025-07-16 DIAGNOSIS — I10 PRIMARY HYPERTENSION: ICD-10-CM

## 2025-07-16 DIAGNOSIS — I10 WHITE COAT SYNDROME WITH HYPERTENSION: ICD-10-CM

## 2025-07-16 DIAGNOSIS — M45.0 ANKYLOSING SPONDYLITIS OF MULTIPLE SITES IN SPINE (MULTI): ICD-10-CM

## 2025-07-16 DIAGNOSIS — E78.5 DYSLIPIDEMIA: ICD-10-CM

## 2025-07-16 PROBLEM — J06.9 UPPER RESPIRATORY TRACT INFECTION: Status: RESOLVED | Noted: 2024-12-06 | Resolved: 2025-07-16

## 2025-07-16 PROBLEM — J02.9 SORE THROAT: Status: RESOLVED | Noted: 2023-08-17 | Resolved: 2025-07-16

## 2025-07-16 PROBLEM — R31.9 HEMATURIA: Status: RESOLVED | Noted: 2023-08-17 | Resolved: 2025-07-16

## 2025-07-16 PROBLEM — I51.7 LEFT ATRIAL ENLARGEMENT: Status: RESOLVED | Noted: 2023-08-17 | Resolved: 2025-07-16

## 2025-07-16 PROBLEM — R07.9 CHEST PAIN: Status: RESOLVED | Noted: 2023-08-17 | Resolved: 2025-07-16

## 2025-07-16 PROBLEM — B37.31 YEAST VAGINITIS: Status: RESOLVED | Noted: 2023-08-17 | Resolved: 2025-07-16

## 2025-07-16 PROCEDURE — G0439 PPPS, SUBSEQ VISIT: HCPCS | Performed by: FAMILY MEDICINE

## 2025-07-16 PROCEDURE — 99213 OFFICE O/P EST LOW 20 MIN: CPT | Performed by: FAMILY MEDICINE

## 2025-07-16 PROCEDURE — 3079F DIAST BP 80-89 MM HG: CPT | Performed by: FAMILY MEDICINE

## 2025-07-16 PROCEDURE — 1036F TOBACCO NON-USER: CPT | Performed by: FAMILY MEDICINE

## 2025-07-16 PROCEDURE — 3008F BODY MASS INDEX DOCD: CPT | Performed by: FAMILY MEDICINE

## 2025-07-16 PROCEDURE — 3074F SYST BP LT 130 MM HG: CPT | Performed by: FAMILY MEDICINE

## 2025-07-16 RX ORDER — TRAZODONE HYDROCHLORIDE 50 MG/1
100 TABLET ORAL NIGHTLY PRN
Qty: 60 TABLET | Refills: 2 | Status: SHIPPED | OUTPATIENT
Start: 2025-07-16 | End: 2026-07-16

## 2025-07-16 RX ORDER — BISOPROLOL FUMARATE AND HYDROCHLOROTHIAZIDE 2.5; 6.25 MG/1; MG/1
1 TABLET ORAL DAILY
Qty: 90 TABLET | Refills: 1 | Status: SHIPPED | OUTPATIENT
Start: 2025-07-16 | End: 2026-07-16

## 2025-07-26 DIAGNOSIS — N95.8 GENITOURINARY SYNDROME OF MENOPAUSE: ICD-10-CM

## 2025-07-29 RX ORDER — ESTRADIOL 10 UG/1
TABLET, FILM COATED VAGINAL
Qty: 24 TABLET | Refills: 3 | Status: SHIPPED | OUTPATIENT
Start: 2025-07-29

## 2025-11-19 ENCOUNTER — APPOINTMENT (OUTPATIENT)
Dept: OBSTETRICS AND GYNECOLOGY | Facility: CLINIC | Age: 60
End: 2025-11-19
Payer: MEDICARE

## (undated) DEVICE — NEEDLE, HYPODERMIC, SAFETY, GLIDE, 18G X 1.5"

## (undated) DEVICE — GLOVE, SURGICAL, PROTEXIS PI , 7.0, PF, LF

## (undated) DEVICE — PADDING, UNDERCAST, WEBRIL, 3 IN X 4 YD, REG, NS

## (undated) DEVICE — BANDAGE, ELASTIC, MATRIX, SELF-CLOSURE, 3 IN X 5 YD, LF

## (undated) DEVICE — SYRINGE, 10 CC, LUER LOCK

## (undated) DEVICE — NEEDLE, HYPODERMIC, REGULAR WALL, REGULAR BEVEL, 18 G X 1.5 IN

## (undated) DEVICE — GLOVE, SURGICAL, PROTEXIS PI , 7.5, PF, LF

## (undated) DEVICE — PACK, BASIC

## (undated) DEVICE — DRAPE, TOWEL, STERI DRAPE, 17 X 11 IN, PLASTIC, STERILE

## (undated) DEVICE — PREP TRAY, SKIN, DRY, W/GLOVES

## (undated) DEVICE — SPONGE, GAUZE, XRAY DECT, 16 PLY, 4 X 4, W/MASTER DMT,STERILE

## (undated) DEVICE — DRESSING, GAUZE, SPONGE, 12 PLY, CURITY, 4 X 4 IN, STERILE

## (undated) DEVICE — Device

## (undated) DEVICE — DRESSING, GAUZE, PETROLATUM, STRIP, XEROFORM, 1 X 8 IN, STERILE

## (undated) DEVICE — SUTURE, MONOCRYLIC, 4-0, P3, MONO 18

## (undated) DEVICE — DRAPE, SHEET, HAND, GUSSETTED, W/TABLE EXTENSION, 77 X 146 IN, DISPOSABLE, LF, STERILE

## (undated) DEVICE — PREP TRAY, SKIN, DRY, W/12 SPONGES, W/GLOVES

## (undated) DEVICE — SUTURE, VICRYL, 4-0, 18 IN, P-3, UNDYED

## (undated) DEVICE — STOCKINETTE, DOUBLE PLY, 4 X 48 IN, STERILE

## (undated) DEVICE — CORD, CAUTERY, BIOPOLAR FORCEP, 12FT

## (undated) DEVICE — MARKER, SKIN, REGULAR TIP, W/W/FLEXI RULER, LABEL

## (undated) DEVICE — DRESSING, GAUZE, SPONGE, KERLIX, SUPER, 6 X 6.75 IN, STERILE 10PK

## (undated) DEVICE — CUFF, TOURNIQUET, 18 X 4, SNGL PORT/SNGL BLADDER, DISP, LF

## (undated) DEVICE — BANDAGE, ESMARK 4 IN X 9 FT, STERILE